# Patient Record
Sex: FEMALE | Race: WHITE | NOT HISPANIC OR LATINO | Employment: OTHER | URBAN - METROPOLITAN AREA
[De-identification: names, ages, dates, MRNs, and addresses within clinical notes are randomized per-mention and may not be internally consistent; named-entity substitution may affect disease eponyms.]

---

## 2023-02-08 PROBLEM — R25.1 TREMOR: Status: ACTIVE | Noted: 2023-02-08

## 2023-02-08 PROBLEM — H01.003 BLEPHARITIS OF BOTH EYES: Status: ACTIVE | Noted: 2023-02-08

## 2023-02-08 PROBLEM — R53.83 FATIGUE: Status: ACTIVE | Noted: 2023-02-08

## 2023-02-08 PROBLEM — I50.22 CHRONIC SYSTOLIC HEART FAILURE, ACC/AHA STAGE C (MULTI): Status: ACTIVE | Noted: 2023-02-08

## 2023-02-08 PROBLEM — R22.31 LOCALIZED SWELLING ON RIGHT HAND: Status: ACTIVE | Noted: 2023-02-08

## 2023-02-08 PROBLEM — R55 POSTURAL DIZZINESS WITH PRESYNCOPE: Status: ACTIVE | Noted: 2023-02-08

## 2023-02-08 PROBLEM — R42 POSTURAL DIZZINESS WITH PRESYNCOPE: Status: ACTIVE | Noted: 2023-02-08

## 2023-02-08 PROBLEM — M79.601 PAIN OF RIGHT UPPER EXTREMITY: Status: ACTIVE | Noted: 2023-02-08

## 2023-02-08 PROBLEM — S79.912S: Status: ACTIVE | Noted: 2023-02-08

## 2023-02-08 PROBLEM — I72.9 PSEUDOANEURYSM (CMS-HCC): Status: ACTIVE | Noted: 2023-02-08

## 2023-02-08 PROBLEM — H04.123 DRY EYES: Status: ACTIVE | Noted: 2023-02-08

## 2023-02-08 PROBLEM — E11.51: Status: ACTIVE | Noted: 2023-02-08

## 2023-02-08 PROBLEM — Z96.1 PSEUDOPHAKIA OF BOTH EYES: Status: ACTIVE | Noted: 2023-02-08

## 2023-02-08 PROBLEM — E55.9 VITAMIN D DEFICIENCY: Status: ACTIVE | Noted: 2023-02-08

## 2023-02-08 PROBLEM — S59.902S ELBOW INJURY, LEFT, SEQUELA: Status: ACTIVE | Noted: 2023-02-08

## 2023-02-08 PROBLEM — H01.006 BLEPHARITIS OF BOTH EYES: Status: ACTIVE | Noted: 2023-02-08

## 2023-02-08 PROBLEM — K55.9 ISCHEMIC COLITIS (MULTI): Status: ACTIVE | Noted: 2023-02-08

## 2023-02-08 PROBLEM — E78.5 HYPERLIPIDEMIA: Status: ACTIVE | Noted: 2023-02-08

## 2023-02-08 PROBLEM — I10 HYPERTENSION: Status: ACTIVE | Noted: 2023-02-08

## 2023-02-08 PROBLEM — R42 DIZZINESS AND GIDDINESS: Status: ACTIVE | Noted: 2023-02-08

## 2023-02-08 PROBLEM — I48.0 PAROXYSMAL ATRIAL FIBRILLATION WITH RAPID VENTRICULAR RESPONSE (MULTI): Status: ACTIVE | Noted: 2023-02-08

## 2023-02-08 PROBLEM — Z91.81 AT RISK FOR FALLS: Status: ACTIVE | Noted: 2023-02-08

## 2023-02-08 PROBLEM — R20.8 BURNING SENSATION OF RECTUM: Status: ACTIVE | Noted: 2023-02-08

## 2023-02-08 PROBLEM — E03.9 HYPOTHYROIDISM: Status: ACTIVE | Noted: 2023-02-08

## 2023-02-08 PROBLEM — R00.1 BRADYCARDIA: Status: ACTIVE | Noted: 2023-02-08

## 2023-02-08 PROBLEM — K52.9 COLITIS: Status: ACTIVE | Noted: 2023-02-08

## 2023-02-08 PROBLEM — N28.1 BILATERAL RENAL CYSTS: Status: ACTIVE | Noted: 2023-02-08

## 2023-02-08 PROBLEM — H52.209 ASTIGMATISM: Status: ACTIVE | Noted: 2023-02-08

## 2023-02-08 PROBLEM — S59.912S: Status: ACTIVE | Noted: 2023-02-08

## 2023-02-08 RX ORDER — THIAMINE HCL 50 MG
50 TABLET ORAL DAILY
COMMUNITY
End: 2024-04-24 | Stop reason: ALTCHOICE

## 2023-02-08 RX ORDER — CLOPIDOGREL BISULFATE 75 MG/1
TABLET ORAL
COMMUNITY
End: 2023-11-21 | Stop reason: ALTCHOICE

## 2023-02-08 RX ORDER — LEVOTHYROXINE SODIUM 50 UG/1
TABLET ORAL
COMMUNITY
Start: 2020-09-15 | End: 2023-03-17

## 2023-02-08 RX ORDER — AMIODARONE HYDROCHLORIDE 200 MG/1
TABLET ORAL
COMMUNITY
Start: 2020-08-14 | End: 2023-11-21 | Stop reason: ALTCHOICE

## 2023-02-08 RX ORDER — ATORVASTATIN CALCIUM 20 MG/1
20 TABLET, FILM COATED ORAL DAILY
COMMUNITY
Start: 2020-08-14 | End: 2024-03-13 | Stop reason: SDUPTHER

## 2023-02-08 RX ORDER — LOSARTAN POTASSIUM 25 MG/1
25 TABLET ORAL DAILY
COMMUNITY
Start: 2021-06-01 | End: 2024-03-13 | Stop reason: SDUPTHER

## 2023-02-08 RX ORDER — FUROSEMIDE 20 MG/1
20 TABLET ORAL 3 TIMES WEEKLY
COMMUNITY
Start: 2021-04-06 | End: 2023-12-14 | Stop reason: SDUPTHER

## 2023-02-08 RX ORDER — FOLIC ACID 1 MG/1
TABLET ORAL
COMMUNITY

## 2023-02-08 RX ORDER — ACETAMINOPHEN 500 MG
TABLET ORAL
COMMUNITY

## 2023-03-15 LAB
ALANINE AMINOTRANSFERASE (SGPT) (U/L) IN SER/PLAS: 39 U/L (ref 7–45)
ANION GAP IN SER/PLAS: 13 MMOL/L (ref 10–20)
ASPARTATE AMINOTRANSFERASE (SGOT) (U/L) IN SER/PLAS: 44 U/L (ref 9–39)
CALCIUM (MG/DL) IN SER/PLAS: 9.5 MG/DL (ref 8.6–10.3)
CARBON DIOXIDE, TOTAL (MMOL/L) IN SER/PLAS: 24 MMOL/L (ref 21–32)
CHLORIDE (MMOL/L) IN SER/PLAS: 104 MMOL/L (ref 98–107)
CREATININE (MG/DL) IN SER/PLAS: 1.15 MG/DL (ref 0.5–1.05)
ERYTHROCYTE DISTRIBUTION WIDTH (RATIO) BY AUTOMATED COUNT: 14.6 % (ref 11.5–14.5)
ERYTHROCYTE MEAN CORPUSCULAR HEMOGLOBIN CONCENTRATION (G/DL) BY AUTOMATED: 30.8 G/DL (ref 32–36)
ERYTHROCYTE MEAN CORPUSCULAR VOLUME (FL) BY AUTOMATED COUNT: 104 FL (ref 80–100)
ERYTHROCYTES (10*6/UL) IN BLOOD BY AUTOMATED COUNT: 4.63 X10E12/L (ref 4–5.2)
GFR FEMALE: 45 ML/MIN/1.73M2
GLUCOSE (MG/DL) IN SER/PLAS: 83 MG/DL (ref 74–99)
HEMATOCRIT (%) IN BLOOD BY AUTOMATED COUNT: 48.1 % (ref 36–46)
HEMOGLOBIN (G/DL) IN BLOOD: 14.8 G/DL (ref 12–16)
LEUKOCYTES (10*3/UL) IN BLOOD BY AUTOMATED COUNT: 5.1 X10E9/L (ref 4.4–11.3)
PLATELETS (10*3/UL) IN BLOOD AUTOMATED COUNT: 209 X10E9/L (ref 150–450)
POTASSIUM (MMOL/L) IN SER/PLAS: 3.8 MMOL/L (ref 3.5–5.3)
SODIUM (MMOL/L) IN SER/PLAS: 137 MMOL/L (ref 136–145)
THYROTROPIN (MIU/L) IN SER/PLAS BY DETECTION LIMIT <= 0.05 MIU/L: 2.37 MIU/L (ref 0.44–3.98)
UREA NITROGEN (MG/DL) IN SER/PLAS: 19 MG/DL (ref 6–23)

## 2023-03-16 DIAGNOSIS — E03.9 HYPOTHYROIDISM, UNSPECIFIED: ICD-10-CM

## 2023-03-17 RX ORDER — LEVOTHYROXINE SODIUM 50 UG/1
50 TABLET ORAL DAILY
Qty: 90 TABLET | Refills: 2 | Status: SHIPPED | OUTPATIENT
Start: 2023-03-17 | End: 2023-12-06 | Stop reason: ALTCHOICE

## 2023-03-21 ENCOUNTER — OFFICE VISIT (OUTPATIENT)
Dept: PRIMARY CARE | Facility: CLINIC | Age: 88
End: 2023-03-21
Payer: MEDICARE

## 2023-03-21 VITALS
DIASTOLIC BLOOD PRESSURE: 72 MMHG | HEIGHT: 62 IN | HEART RATE: 56 BPM | RESPIRATION RATE: 16 BRPM | BODY MASS INDEX: 19.88 KG/M2 | SYSTOLIC BLOOD PRESSURE: 116 MMHG | WEIGHT: 108 LBS | OXYGEN SATURATION: 97 %

## 2023-03-21 DIAGNOSIS — I10 PRIMARY HYPERTENSION: ICD-10-CM

## 2023-03-21 DIAGNOSIS — Z09 FOLLOW UP: ICD-10-CM

## 2023-03-21 DIAGNOSIS — N18.9 CHRONIC KIDNEY DISEASE, UNSPECIFIED CKD STAGE: ICD-10-CM

## 2023-03-21 DIAGNOSIS — I50.22 CHRONIC SYSTOLIC HEART FAILURE, ACC/AHA STAGE C (MULTI): ICD-10-CM

## 2023-03-21 DIAGNOSIS — R42 DIZZINESS AND GIDDINESS: ICD-10-CM

## 2023-03-21 DIAGNOSIS — E55.9 VITAMIN D DEFICIENCY: ICD-10-CM

## 2023-03-21 DIAGNOSIS — Z71.89 ADVANCE DIRECTIVE DISCUSSED WITH PATIENT: ICD-10-CM

## 2023-03-21 DIAGNOSIS — Z00.00 MEDICARE ANNUAL WELLNESS VISIT, SUBSEQUENT: Primary | ICD-10-CM

## 2023-03-21 DIAGNOSIS — E03.9 HYPOTHYROIDISM, UNSPECIFIED TYPE: ICD-10-CM

## 2023-03-21 DIAGNOSIS — Z13.31 SCREENING FOR DEPRESSION: ICD-10-CM

## 2023-03-21 DIAGNOSIS — I48.0 PAROXYSMAL ATRIAL FIBRILLATION WITH RAPID VENTRICULAR RESPONSE (MULTI): ICD-10-CM

## 2023-03-21 DIAGNOSIS — E78.5 HYPERLIPIDEMIA, UNSPECIFIED HYPERLIPIDEMIA TYPE: ICD-10-CM

## 2023-03-21 PROCEDURE — 1157F ADVNC CARE PLAN IN RCRD: CPT | Performed by: NURSE PRACTITIONER

## 2023-03-21 PROCEDURE — 99214 OFFICE O/P EST MOD 30 MIN: CPT | Performed by: NURSE PRACTITIONER

## 2023-03-21 PROCEDURE — 1036F TOBACCO NON-USER: CPT | Performed by: NURSE PRACTITIONER

## 2023-03-21 PROCEDURE — 1170F FXNL STATUS ASSESSED: CPT | Performed by: NURSE PRACTITIONER

## 2023-03-21 PROCEDURE — 3078F DIAST BP <80 MM HG: CPT | Performed by: NURSE PRACTITIONER

## 2023-03-21 PROCEDURE — 1160F RVW MEDS BY RX/DR IN RCRD: CPT | Performed by: NURSE PRACTITIONER

## 2023-03-21 PROCEDURE — 1159F MED LIST DOCD IN RCRD: CPT | Performed by: NURSE PRACTITIONER

## 2023-03-21 PROCEDURE — G0439 PPPS, SUBSEQ VISIT: HCPCS | Performed by: NURSE PRACTITIONER

## 2023-03-21 PROCEDURE — 3074F SYST BP LT 130 MM HG: CPT | Performed by: NURSE PRACTITIONER

## 2023-03-21 ASSESSMENT — ACTIVITIES OF DAILY LIVING (ADL)
DRESSING: INDEPENDENT
MANAGING_FINANCES: INDEPENDENT
TAKING_MEDICATION: INDEPENDENT
BATHING: INDEPENDENT
DOING_HOUSEWORK: INDEPENDENT
GROCERY_SHOPPING: INDEPENDENT

## 2023-03-21 ASSESSMENT — ENCOUNTER SYMPTOMS
LOSS OF SENSATION IN FEET: 0
OCCASIONAL FEELINGS OF UNSTEADINESS: 0
DEPRESSION: 0

## 2023-03-21 ASSESSMENT — PATIENT HEALTH QUESTIONNAIRE - PHQ9
SUM OF ALL RESPONSES TO PHQ9 QUESTIONS 1 AND 2: 0
2. FEELING DOWN, DEPRESSED OR HOPELESS: NOT AT ALL
1. LITTLE INTEREST OR PLEASURE IN DOING THINGS: NOT AT ALL

## 2023-03-21 NOTE — PROGRESS NOTES
"Subjective   Reason for Visit: Mirtha Acuna is an 89 y.o. female here for a Medicare Wellness visit.     Past Medical, Surgical, and Family History reviewed and updated in chart.    Reviewed all medications by prescribing practitioner or clinical pharmacist (such as prescriptions, OTCs, herbal therapies and supplements) and documented in the medical record.    Patient is also here for management of multiple chronic diseases.  Most recent lab result completed by cardiology indicate worsening kidney function.  However, patient continued to refuse consult with nephrology.  She denies acute medical complaint.        Patient Care Team:  ANJALI Jauregui-CNP as PCP - General     Review of Systems   All other systems reviewed and are negative.      Objective   Vitals:  /72 (BP Location: Left arm)   Pulse 56   Resp 16   Ht 1.575 m (5' 2\")   Wt 49 kg (108 lb)   SpO2 97%   BMI 19.75 kg/m²       Physical Exam  HENT:      Head: Atraumatic.      Mouth/Throat:      Mouth: Mucous membranes are moist.   Eyes:      Extraocular Movements: Extraocular movements intact.      Pupils: Pupils are equal, round, and reactive to light.   Cardiovascular:      Rate and Rhythm: Bradycardia present. Rhythm irregular.   Pulmonary:      Effort: Pulmonary effort is normal.      Breath sounds: Normal breath sounds.   Abdominal:      General: Abdomen is flat. Bowel sounds are normal.      Palpations: Abdomen is soft.   Musculoskeletal:         General: Normal range of motion.      Cervical back: Neck supple.   Skin:     General: Skin is warm and dry.      Capillary Refill: Capillary refill takes less than 2 seconds.   Neurological:      Mental Status: She is alert. Mental status is at baseline.   Psychiatric:         Mood and Affect: Mood normal.         Behavior: Behavior normal.         Thought Content: Thought content normal.         Judgment: Judgment normal.         Assessment/Plan   Problem List Items Addressed This Visit  "         Circulatory    Chronic systolic heart failure, ACC/AHA stage C (CMS/HCC)    Paroxysmal atrial fibrillation with rapid ventricular response (CMS/HCC)    Hypertension       Endocrine/Metabolic    Hypothyroidism    Vitamin D deficiency       Other    Dizziness and giddiness    Hyperlipidemia     Other Visit Diagnoses       Follow up    -  Primary    Relevant Orders    Follow Up In [facility] - PCP    Medicare annual wellness visit, subsequent        Advance directive discussed with patient        Screening for depression        Chronic kidney disease, unspecified CKD stage        Relevant Orders    Basic metabolic panel

## 2023-03-21 NOTE — PATIENT INSTRUCTIONS
Continue with all current medications as directed and follow up in 6 months. Check kidney function prior to follow up in 6 months.

## 2023-09-21 RX ORDER — UBIDECARENONE 75 MG
CAPSULE ORAL ONCE
Status: CANCELLED | OUTPATIENT
Start: 2023-09-21 | End: 2023-09-21

## 2023-09-23 ENCOUNTER — DOCUMENTATION (OUTPATIENT)
Dept: CARDIOLOGY | Facility: CLINIC | Age: 88
End: 2023-09-23
Payer: MEDICARE

## 2023-10-03 ENCOUNTER — APPOINTMENT (OUTPATIENT)
Dept: CARDIOLOGY | Facility: CLINIC | Age: 88
End: 2023-10-03
Payer: MEDICARE

## 2023-10-24 ENCOUNTER — OFFICE VISIT (OUTPATIENT)
Dept: PRIMARY CARE | Facility: CLINIC | Age: 88
End: 2023-10-24
Payer: MEDICARE

## 2023-10-24 VITALS
WEIGHT: 107.4 LBS | BODY MASS INDEX: 19.77 KG/M2 | HEART RATE: 96 BPM | SYSTOLIC BLOOD PRESSURE: 114 MMHG | OXYGEN SATURATION: 96 % | DIASTOLIC BLOOD PRESSURE: 68 MMHG | RESPIRATION RATE: 16 BRPM | HEIGHT: 62 IN

## 2023-10-24 DIAGNOSIS — E55.9 VITAMIN D DEFICIENCY: ICD-10-CM

## 2023-10-24 DIAGNOSIS — I10 PRIMARY HYPERTENSION: Primary | ICD-10-CM

## 2023-10-24 DIAGNOSIS — E03.9 HYPOTHYROIDISM, UNSPECIFIED TYPE: ICD-10-CM

## 2023-10-24 DIAGNOSIS — I48.0 PAROXYSMAL ATRIAL FIBRILLATION WITH RAPID VENTRICULAR RESPONSE (MULTI): ICD-10-CM

## 2023-10-24 DIAGNOSIS — Z00.00 MEDICARE ANNUAL WELLNESS VISIT, SUBSEQUENT: ICD-10-CM

## 2023-10-24 DIAGNOSIS — I50.22 CHRONIC SYSTOLIC HEART FAILURE, ACC/AHA STAGE C (MULTI): ICD-10-CM

## 2023-10-24 PROBLEM — M79.603 ARM PAIN: Status: ACTIVE | Noted: 2023-10-24

## 2023-10-24 PROBLEM — Z95.5 PRESENCE OF STENT IN CORONARY ARTERY: Status: ACTIVE | Noted: 2023-10-24

## 2023-10-24 PROCEDURE — 1160F RVW MEDS BY RX/DR IN RCRD: CPT | Performed by: NURSE PRACTITIONER

## 2023-10-24 PROCEDURE — 3078F DIAST BP <80 MM HG: CPT | Performed by: NURSE PRACTITIONER

## 2023-10-24 PROCEDURE — 3074F SYST BP LT 130 MM HG: CPT | Performed by: NURSE PRACTITIONER

## 2023-10-24 PROCEDURE — 1126F AMNT PAIN NOTED NONE PRSNT: CPT | Performed by: NURSE PRACTITIONER

## 2023-10-24 PROCEDURE — 1159F MED LIST DOCD IN RCRD: CPT | Performed by: NURSE PRACTITIONER

## 2023-10-24 PROCEDURE — 1036F TOBACCO NON-USER: CPT | Performed by: NURSE PRACTITIONER

## 2023-10-24 PROCEDURE — 99214 OFFICE O/P EST MOD 30 MIN: CPT | Performed by: NURSE PRACTITIONER

## 2023-10-24 RX ORDER — VITS A,C,E/LUTEIN/MINERALS 300MCG-200
1 TABLET ORAL DAILY
COMMUNITY
End: 2024-04-24 | Stop reason: ALTCHOICE

## 2023-10-24 RX ORDER — ACETAMINOPHEN 500 MG
TABLET ORAL
COMMUNITY
End: 2024-04-24 | Stop reason: ALTCHOICE

## 2023-10-24 ASSESSMENT — ANXIETY QUESTIONNAIRES
5. BEING SO RESTLESS THAT IT IS HARD TO SIT STILL: NOT AT ALL
6. BECOMING EASILY ANNOYED OR IRRITABLE: NOT AT ALL
3. WORRYING TOO MUCH ABOUT DIFFERENT THINGS: NOT AT ALL
IF YOU CHECKED OFF ANY PROBLEMS ON THIS QUESTIONNAIRE, HOW DIFFICULT HAVE THESE PROBLEMS MADE IT FOR YOU TO DO YOUR WORK, TAKE CARE OF THINGS AT HOME, OR GET ALONG WITH OTHER PEOPLE: NOT DIFFICULT AT ALL
4. TROUBLE RELAXING: NOT AT ALL
7. FEELING AFRAID AS IF SOMETHING AWFUL MIGHT HAPPEN: NOT AT ALL
GAD7 TOTAL SCORE: 0
2. NOT BEING ABLE TO STOP OR CONTROL WORRYING: NOT AT ALL
1. FEELING NERVOUS, ANXIOUS, OR ON EDGE: NOT AT ALL

## 2023-10-24 ASSESSMENT — COLUMBIA-SUICIDE SEVERITY RATING SCALE - C-SSRS
6. HAVE YOU EVER DONE ANYTHING, STARTED TO DO ANYTHING, OR PREPARED TO DO ANYTHING TO END YOUR LIFE?: NO
2. HAVE YOU ACTUALLY HAD ANY THOUGHTS OF KILLING YOURSELF?: NO
1. IN THE PAST MONTH, HAVE YOU WISHED YOU WERE DEAD OR WISHED YOU COULD GO TO SLEEP AND NOT WAKE UP?: NO

## 2023-10-24 ASSESSMENT — ENCOUNTER SYMPTOMS
LOSS OF SENSATION IN FEET: 0
OCCASIONAL FEELINGS OF UNSTEADINESS: 0
DEPRESSION: 0

## 2023-10-24 ASSESSMENT — PATIENT HEALTH QUESTIONNAIRE - PHQ9
SUM OF ALL RESPONSES TO PHQ9 QUESTIONS 1 AND 2: 0
1. LITTLE INTEREST OR PLEASURE IN DOING THINGS: NOT AT ALL
2. FEELING DOWN, DEPRESSED OR HOPELESS: NOT AT ALL

## 2023-10-24 NOTE — PATIENT INSTRUCTIONS
Continue taking all current medications as prescribed and complete labs a week prior to 6 months follow up for annual medicare wellness exam.

## 2023-10-24 NOTE — PROGRESS NOTES
"Subjective   Patient ID: Mirtha Acuna is a 89 y.o. female who presents for Follow-up.    Patient is ambulatory with a cane for assistance, accompanied by her daughter-in-law following up for management of multiple chronic diseases.  Advises that she is compliant with her medications.  Advises no new symptoms besides chronic persistent exertional dyspnea, tremor and dizziness.         Review of Systems   All other systems reviewed and are negative.      Objective   /68   Pulse 96   Resp 16   Ht 1.575 m (5' 2\")   Wt 48.7 kg (107 lb 6.4 oz)   SpO2 96%   BMI 19.64 kg/m²     Physical Exam  Constitutional:       Appearance: Normal appearance.   HENT:      Head: Normocephalic and atraumatic.      Right Ear: External ear normal.      Left Ear: External ear normal.      Nose: Nose normal.      Mouth/Throat:      Mouth: Mucous membranes are moist.   Cardiovascular:      Rate and Rhythm: Normal rate and regular rhythm.      Pulses: Normal pulses.      Heart sounds: Normal heart sounds.   Pulmonary:      Effort: Pulmonary effort is normal.      Breath sounds: Normal breath sounds.   Abdominal:      General: Abdomen is flat. Bowel sounds are normal.      Palpations: Abdomen is soft.   Musculoskeletal:      Cervical back: Neck supple.   Skin:     General: Skin is warm and dry.   Neurological:      Mental Status: She is alert and oriented to person, place, and time. Mental status is at baseline.   Psychiatric:         Mood and Affect: Mood normal.         Behavior: Behavior normal.         Thought Content: Thought content normal.         Judgment: Judgment normal.         Assessment/Plan   Problem List Items Addressed This Visit       Chronic systolic heart failure, ACC/AHA stage C (CMS/HCC)    Relevant Orders    Basic metabolic panel    Hypothyroidism    Relevant Orders    TSH with reflex to Free T4 if abnormal    Hypertension - Primary    Relevant Orders    CBC    Vitamin D deficiency    Relevant Orders    Vitamin " B12    Vitamin D 25-Hydroxy,Total (for eval of Vitamin D levels)     Other Visit Diagnoses       Medicare annual wellness visit, subsequent        Relevant Orders    Follow Up In [facility] - PCP - Medicare Annual

## 2023-11-07 ENCOUNTER — APPOINTMENT (OUTPATIENT)
Dept: CARDIOLOGY | Facility: CLINIC | Age: 88
End: 2023-11-07
Payer: MEDICARE

## 2023-11-10 ENCOUNTER — LAB (OUTPATIENT)
Dept: LAB | Facility: LAB | Age: 88
End: 2023-11-10
Payer: MEDICARE

## 2023-11-10 DIAGNOSIS — E55.9 VITAMIN D DEFICIENCY: ICD-10-CM

## 2023-11-10 DIAGNOSIS — I50.22 CHRONIC SYSTOLIC HEART FAILURE, ACC/AHA STAGE C (MULTI): ICD-10-CM

## 2023-11-10 DIAGNOSIS — I10 PRIMARY HYPERTENSION: ICD-10-CM

## 2023-11-10 DIAGNOSIS — E03.9 HYPOTHYROIDISM, UNSPECIFIED TYPE: ICD-10-CM

## 2023-11-10 DIAGNOSIS — N18.9 CHRONIC KIDNEY DISEASE, UNSPECIFIED CKD STAGE: ICD-10-CM

## 2023-11-10 LAB
25(OH)D3 SERPL-MCNC: >120 NG/ML (ref 30–100)
ANION GAP SERPL CALC-SCNC: 15 MMOL/L (ref 10–20)
BUN SERPL-MCNC: 21 MG/DL (ref 6–23)
CALCIUM SERPL-MCNC: 9 MG/DL (ref 8.6–10.3)
CHLORIDE SERPL-SCNC: 108 MMOL/L (ref 98–107)
CO2 SERPL-SCNC: 21 MMOL/L (ref 21–32)
CREAT SERPL-MCNC: 1 MG/DL (ref 0.5–1.05)
ERYTHROCYTE [DISTWIDTH] IN BLOOD BY AUTOMATED COUNT: 14.2 % (ref 11.5–14.5)
GFR SERPL CREATININE-BSD FRML MDRD: 53 ML/MIN/1.73M*2
GLUCOSE SERPL-MCNC: 104 MG/DL (ref 74–99)
HCT VFR BLD AUTO: 42.3 % (ref 36–46)
HGB BLD-MCNC: 14.4 G/DL (ref 12–16)
MCH RBC QN AUTO: 31.9 PG (ref 26–34)
MCHC RBC AUTO-ENTMCNC: 34 G/DL (ref 32–36)
MCV RBC AUTO: 94 FL (ref 80–100)
NRBC BLD-RTO: 0 /100 WBCS (ref 0–0)
PLATELET # BLD AUTO: 247 X10*3/UL (ref 150–450)
POTASSIUM SERPL-SCNC: 4.7 MMOL/L (ref 3.5–5.3)
RBC # BLD AUTO: 4.51 X10*6/UL (ref 4–5.2)
SODIUM SERPL-SCNC: 139 MMOL/L (ref 136–145)
T4 FREE SERPL-MCNC: 1.74 NG/DL (ref 0.61–1.12)
TSH SERPL-ACNC: 0.03 MIU/L (ref 0.44–3.98)
VIT B12 SERPL-MCNC: 3253 PG/ML (ref 211–911)
WBC # BLD AUTO: 5.1 X10*3/UL (ref 4.4–11.3)

## 2023-11-10 PROCEDURE — 82607 VITAMIN B-12: CPT

## 2023-11-10 PROCEDURE — 85027 COMPLETE CBC AUTOMATED: CPT

## 2023-11-10 PROCEDURE — 36415 COLL VENOUS BLD VENIPUNCTURE: CPT

## 2023-11-10 PROCEDURE — 82306 VITAMIN D 25 HYDROXY: CPT

## 2023-11-10 PROCEDURE — 84443 ASSAY THYROID STIM HORMONE: CPT

## 2023-11-10 PROCEDURE — 84439 ASSAY OF FREE THYROXINE: CPT

## 2023-11-10 PROCEDURE — 80048 BASIC METABOLIC PNL TOTAL CA: CPT

## 2023-11-14 NOTE — PROGRESS NOTES
Chief Complaint/Reason for Visit:   8 month cardiovascular follow up.     History Of Present Illness:      Ms. Acuna is coming in today as an 8-month cardiovascular follow-up.  We have followed this patient previously for atrial fibrillation, systolic heart failure, moderate, nonobstructive coronary artery disease, and bradycardia.  She had left and right heart catheterizations in 2020 which showed normal hemodynamics and moderate nonobstructive coronary artery disease.  She has followed consistently with the CHF clinic.  Most recent echocardiogram was done in December, 2021 showing normalization of her LVEF (60-65%).    Patient presents today in a wheelchair accompanied by her granddaughter.  She is very anxious and all upset about coming to the office visit.  She complains of being cold and shaky and feels short of breath although her pulse oximeter shows O2 sat of 96%.  Her granddaughter believes it is anxiety.  Patient is not having any chest pain, pressure, palpitations, or edema.  She does note dyspnea  primarily when she is anxious.  According to the granddaughter, she has been having issues with worsening memory.  Patient continues to live independently.  She is taking her medication but does not have an updated medication list during this office visit.    Past Medical History:  She has a past medical history of Personal history of other diseases of the circulatory system, Pure hypercholesterolemia, unspecified, Unspecified cataract, and Unspecified cataract.    Past Surgical History:  She has a past surgical history that includes Tonsillectomy (05/05/2015); Hysterectomy (05/05/2015); Appendectomy (05/05/2015); Back surgery (05/05/2015); CT angio abdomen pelvis w and or wo IV IV contrast (7/24/2020); and CT angio abdomen pelvis w and or wo IV IV contrast (8/3/2020).      Social History:  She reports that she has never smoked. She has never used smokeless tobacco. No history on file for alcohol use and drug  use.    Family History:  Family History   Problem Relation Name Age of Onset    Hypertension Mother      Alzheimer's disease Sister      Hypertension Sister      Breast cancer Sister      Hypertension Brother          Allergies:  Penicillins, Sulfamethoxazole, and Erythromycin    Medications:  Current Outpatient Medications   Medication Instructions    acetaminophen (Tylenol) 500 mg tablet No dose, route, or frequency recorded.    apixaban (Eliquis) 2.5 mg tablet Take one tablet every 12 hours    atorvastatin (Lipitor) 20 mg tablet Daily    cholecalciferol (Vitamin D-3) 50 mcg (2,000 unit) capsule oral    cyanocobalamin (Vitamin B-12) 50 mcg tablet TAKE 1 TABLET DAILY.    folic acid (Folvite) 1 mg tablet TAKE 1 TABLET DAILY.    furosemide (Lasix) 20 mg tablet Take 1 tablet (20 mg) by mouth 3 times a week. Take on Monday-Wednesday and Saturday    levothyroxine (SYNTHROID, LEVOXYL) 50 mcg, oral, Daily    losartan (Cozaar) 25 mg tablet 1 tablet daily.    NON FORMULARY Prevagen 10 MG Oral Capsule; 1 tablet daily.    thiamine (Vitamin B-1) 50 mg tablet No dose, route, or frequency recorded.    vit A,C and E-lutein-minerals (Ocuvite with Lutein) 300 mcg-200 mg-27 mg-2 mg tablet 1 tablet, oral, Daily       Review of Systems:  Review of Systems   Constitutional: Positive for malaise/fatigue. Negative for decreased appetite.   HENT: Negative.     Eyes:  Negative for blurred vision and visual disturbance.   Cardiovascular:  Positive for dyspnea on exertion. Negative for chest pain, irregular heartbeat, leg swelling, orthopnea, palpitations and syncope.   Respiratory: Negative.  Negative for cough and shortness of breath.    Musculoskeletal:  Negative for arthritis and falls.   Gastrointestinal: Negative.    Neurological:  Negative for focal weakness and light-headedness.   Psychiatric/Behavioral:  Positive for memory loss. Negative for depression. The patient is nervous/anxious.         Vitals  Visit Vitals  /72   Pulse  52   Wt 47.6 kg (105 lb)   BMI 19.20 kg/m²   OB Status Postmenopausal   Smoking Status Never   BSA 1.44 m²          Physical Exam:  Constitutional: alert and in no acute distress.   No JVD  Pulmonary: no increased work of breathing or signs, mild respiratory difficulty  and lungs clear to auscultation.    Cardiovascular: carotid pulses 2+ bilaterally with no bruit , JVP was normal, regular rhythm, normal S1 and S2, no murmurs , pedal pulses 2+ bilaterally  and no edema .   Abdomen: Soft, non tender, normal bowel sounds.   Tremors of hands  In WC today, MCCALL   Psychiatric:  Pleasantly confused, anxious today         Last Labs:  CBC -  Lab Results   Component Value Date    WBC 5.1 11/10/2023    HGB 14.4 11/10/2023    HCT 42.3 11/10/2023    MCV 94 11/10/2023     11/10/2023     Lab Results   Component Value Date    GLUCOSE 104 (H) 11/10/2023    CALCIUM 9.0 11/10/2023     11/10/2023    K 4.7 11/10/2023    CO2 21 11/10/2023     (H) 11/10/2023    BUN 21 11/10/2023    CREATININE 1.00 11/10/2023      CMP -  Lab Results   Component Value Date    CALCIUM 9.0 11/10/2023    PHOS 3.3 2021    PROT 6.8 2022    ALBUMIN 3.8 2022    AST 44 (H) 03/15/2023    ALT 39 03/15/2023    ALKPHOS 78 2022    BILITOT 0.5 2022       LIPID PANEL -   Lab Results   Component Value Date    CHOL 122 2021    TRIG 46 2021    HDL 45.6 2021    CHHDL 2.7 2021    LDLF 67 2021    VLDL 9 2021       Lab Results   Component Value Date    BNP 96 2022    HGBA1C 5.6 2021       Last Cardiology Tests:    EK23  EKG done in the office today and personally reviewed shows a junctional rhythm at 47 bpm, QT intervals 502 ms.  This will go to Dr. Zaragoza for review.      Echo:21  CONCLUSIONS:   1. The left ventricular systolic function is normal with a 60-65% estimated ejection fraction.   2. Spectral Doppler shows an impaired relaxation pattern of left  ventricular diastolic filling.     Cath:12-27-21  CONCLUSIONS:   1. Single vessel coronary artery disease without proximal left anterior descending involvement.   2. The 1st diagonal branch showed moderate atherosclerotic disease.   3. Culprit vessel(s): circumflex.   4. Successful PCI of Cx.       Lab review: I have personally reviewed the laboratory result(s)     Assessment/Plan:  Paroxysmal atrial fibrillation: Patient has been maintaining sinus rhythm with use of amiodarone.  Based on her chart, she is on 200 mg daily.  It appears that at last office visit she was to reduce this to 100 mg daily.  The patient's family will need to confirm her medication and let us know what she is taking.  I gave the patient an order for amiodarone labs that would be due in 6 months.  Patient is anticoagulated with Eliquis.  She is not having any abnormal bleeding or bruising and will continue on anticoagulation.    Chronic systolic heart failure: Patient has a history of nonischemic cardiomyopathy that has normalized over time.  Her most recent ejection fraction was 60-65% (2021) patient appears to be well compensated.  Due to side effects of medication and blood pressure issues, she is only on losartan.  It is unclear if she is continuing on furosemide.    Bradycardia: Patient has a history of bradycardia and today appears to be junctional rhythm which is not new for her.  We may want to discontinue amiodarone altogether.  I will have Dr. Zaragoza review this case and I will touch base with the patient's family.  Patient previously was encouraged to make an appointment with Dr. Matt but declined.    Patient is currently scheduled to follow-up with Dr. Zaragoza in March.  I will get back with the patient's family once I review this case further with Dr. Zaragoza.  Patient instructed to call with any cardiovascular complaints. All questions were answered.       ADDENDUM:   I called and spoke with patient's  daughter-in-law, Christiane Dias and her son Nabor.  Patient's medications were reviewed and updated.  She is currently off of amiodarone, and clopidogrel.  She takes furosemide 3 days/week.  I reviewed the patient's abnormal EKG with heart rate in the 40s and discussed possible referral to the EP service.  At this point, the family declines referral to EP due to her advanced age and memory issues.  They prefer more conservative medical therapy.  Patient will continue on her current regimen and follow-up as planned.    Dragon dictation was utilized to create this document. Quite often unanticipated grammatical, syntax,  and other interpretive errors are inadvertently transcribed by the computer software.  Please disregard these errors.  Please excuse any errors that have escaped final proofreading.                Rola Sousa, APRN-CNP

## 2023-11-21 ENCOUNTER — OFFICE VISIT (OUTPATIENT)
Dept: CARDIOLOGY | Facility: CLINIC | Age: 88
End: 2023-11-21
Payer: MEDICARE

## 2023-11-21 VITALS
HEART RATE: 52 BPM | BODY MASS INDEX: 19.2 KG/M2 | WEIGHT: 105 LBS | SYSTOLIC BLOOD PRESSURE: 120 MMHG | DIASTOLIC BLOOD PRESSURE: 72 MMHG

## 2023-11-21 DIAGNOSIS — I10 PRIMARY HYPERTENSION: ICD-10-CM

## 2023-11-21 DIAGNOSIS — I50.22 CHRONIC SYSTOLIC HEART FAILURE, ACC/AHA STAGE C (MULTI): ICD-10-CM

## 2023-11-21 DIAGNOSIS — R00.1 BRADYCARDIA: ICD-10-CM

## 2023-11-21 DIAGNOSIS — E78.5 HYPERLIPIDEMIA, UNSPECIFIED HYPERLIPIDEMIA TYPE: ICD-10-CM

## 2023-11-21 DIAGNOSIS — I25.10 ASHD (ARTERIOSCLEROTIC HEART DISEASE): ICD-10-CM

## 2023-11-21 DIAGNOSIS — I48.0 PAROXYSMAL ATRIAL FIBRILLATION WITH RAPID VENTRICULAR RESPONSE (MULTI): Primary | ICD-10-CM

## 2023-11-21 PROCEDURE — 1160F RVW MEDS BY RX/DR IN RCRD: CPT | Performed by: NURSE PRACTITIONER

## 2023-11-21 PROCEDURE — 1126F AMNT PAIN NOTED NONE PRSNT: CPT | Performed by: NURSE PRACTITIONER

## 2023-11-21 PROCEDURE — 3074F SYST BP LT 130 MM HG: CPT | Performed by: NURSE PRACTITIONER

## 2023-11-21 PROCEDURE — 99214 OFFICE O/P EST MOD 30 MIN: CPT | Performed by: NURSE PRACTITIONER

## 2023-11-21 PROCEDURE — 1159F MED LIST DOCD IN RCRD: CPT | Performed by: NURSE PRACTITIONER

## 2023-11-21 PROCEDURE — 93000 ELECTROCARDIOGRAM COMPLETE: CPT | Performed by: INTERNAL MEDICINE

## 2023-11-21 PROCEDURE — 3078F DIAST BP <80 MM HG: CPT | Performed by: NURSE PRACTITIONER

## 2023-11-21 PROCEDURE — 1036F TOBACCO NON-USER: CPT | Performed by: NURSE PRACTITIONER

## 2023-11-21 ASSESSMENT — ENCOUNTER SYMPTOMS
ORTHOPNEA: 0
RESPIRATORY NEGATIVE: 1
GASTROINTESTINAL NEGATIVE: 1
LIGHT-HEADEDNESS: 0
SYNCOPE: 0
FALLS: 0
DECREASED APPETITE: 0
PALPITATIONS: 0
FOCAL WEAKNESS: 0
DYSPNEA ON EXERTION: 1
SHORTNESS OF BREATH: 0
NERVOUS/ANXIOUS: 1
BLURRED VISION: 0
COUGH: 0
IRREGULAR HEARTBEAT: 0
MEMORY LOSS: 1
DEPRESSION: 0

## 2023-11-21 NOTE — PATIENT INSTRUCTIONS
Continue on current meds  Please keep an updated med list for appointments   Heart healthy, low sodium diet  Mediterranean diet is recommended  Follow up with Dr Zaragoza in March as planned

## 2023-12-05 ENCOUNTER — OFFICE VISIT (OUTPATIENT)
Dept: CARDIOLOGY | Facility: HOSPITAL | Age: 88
End: 2023-12-05
Payer: MEDICARE

## 2023-12-05 VITALS
WEIGHT: 107.1 LBS | SYSTOLIC BLOOD PRESSURE: 127 MMHG | BODY MASS INDEX: 19.59 KG/M2 | DIASTOLIC BLOOD PRESSURE: 74 MMHG | RESPIRATION RATE: 24 BRPM | OXYGEN SATURATION: 100 % | HEART RATE: 58 BPM

## 2023-12-05 DIAGNOSIS — I48.0 PAROXYSMAL ATRIAL FIBRILLATION WITH RAPID VENTRICULAR RESPONSE (MULTI): ICD-10-CM

## 2023-12-05 DIAGNOSIS — I10 PRIMARY HYPERTENSION: ICD-10-CM

## 2023-12-05 DIAGNOSIS — I50.22 CHRONIC SYSTOLIC HEART FAILURE, ACC/AHA STAGE C (MULTI): Primary | ICD-10-CM

## 2023-12-05 PROCEDURE — 1159F MED LIST DOCD IN RCRD: CPT | Performed by: NURSE PRACTITIONER

## 2023-12-05 PROCEDURE — 1036F TOBACCO NON-USER: CPT | Performed by: NURSE PRACTITIONER

## 2023-12-05 PROCEDURE — 1160F RVW MEDS BY RX/DR IN RCRD: CPT | Performed by: NURSE PRACTITIONER

## 2023-12-05 PROCEDURE — 1126F AMNT PAIN NOTED NONE PRSNT: CPT | Performed by: NURSE PRACTITIONER

## 2023-12-05 PROCEDURE — 3074F SYST BP LT 130 MM HG: CPT | Performed by: NURSE PRACTITIONER

## 2023-12-05 PROCEDURE — 99212 OFFICE O/P EST SF 10 MIN: CPT | Performed by: NURSE PRACTITIONER

## 2023-12-05 PROCEDURE — 3078F DIAST BP <80 MM HG: CPT | Performed by: NURSE PRACTITIONER

## 2023-12-05 SDOH — ECONOMIC STABILITY: FOOD INSECURITY: WITHIN THE PAST 12 MONTHS, YOU WORRIED THAT YOUR FOOD WOULD RUN OUT BEFORE YOU GOT MONEY TO BUY MORE.: NEVER TRUE

## 2023-12-05 SDOH — ECONOMIC STABILITY: FOOD INSECURITY: WITHIN THE PAST 12 MONTHS, THE FOOD YOU BOUGHT JUST DIDN'T LAST AND YOU DIDN'T HAVE MONEY TO GET MORE.: NEVER TRUE

## 2023-12-05 ASSESSMENT — ENCOUNTER SYMPTOMS
ABDOMINAL DISTENTION: 0
PALPITATIONS: 0
EYES NEGATIVE: 1
WHEEZING: 0
COUGH: 0
CONFUSION: 0
FEVER: 0
BLOOD IN STOOL: 0
DEPRESSION: 0
CHILLS: 0
HEMATURIA: 0
OCCASIONAL FEELINGS OF UNSTEADINESS: 1
CHEST TIGHTNESS: 0
LIGHT-HEADEDNESS: 0
SHORTNESS OF BREATH: 0
LOSS OF SENSATION IN FEET: 0
WEAKNESS: 0
ACTIVITY CHANGE: 0

## 2023-12-05 ASSESSMENT — PATIENT HEALTH QUESTIONNAIRE - PHQ9
1. LITTLE INTEREST OR PLEASURE IN DOING THINGS: NOT AT ALL
SUM OF ALL RESPONSES TO PHQ9 QUESTIONS 1 AND 2: 0
2. FEELING DOWN, DEPRESSED OR HOPELESS: NOT AT ALL

## 2023-12-05 ASSESSMENT — COLUMBIA-SUICIDE SEVERITY RATING SCALE - C-SSRS
2. HAVE YOU ACTUALLY HAD ANY THOUGHTS OF KILLING YOURSELF?: NO
6. HAVE YOU EVER DONE ANYTHING, STARTED TO DO ANYTHING, OR PREPARED TO DO ANYTHING TO END YOUR LIFE?: NO
1. IN THE PAST MONTH, HAVE YOU WISHED YOU WERE DEAD OR WISHED YOU COULD GO TO SLEEP AND NOT WAKE UP?: NO

## 2023-12-05 NOTE — PROGRESS NOTES
Subjective   Patient ID: Mirtha Acuna is a 89 y.o. female who presents for follow-up of congestive heart failure.     Current Outpatient Medications:     acetaminophen (Tylenol) 500 mg tablet, , Disp: , Rfl:     apixaban (Eliquis) 2.5 mg tablet, Take one tablet every 12 hours, Disp: , Rfl:     atorvastatin (Lipitor) 20 mg tablet, in the morning., Disp: , Rfl:     cholecalciferol (Vitamin D-3) 50 mcg (2,000 unit) capsule, Take by mouth., Disp: , Rfl:     cyanocobalamin (Vitamin B-12) 50 mcg tablet, TAKE 1 TABLET DAILY., Disp: , Rfl:     folic acid (Folvite) 1 mg tablet, TAKE 1 TABLET DAILY., Disp: , Rfl:     furosemide (Lasix) 20 mg tablet, Take 1 tablet (20 mg) by mouth 3 times a week. Take on Monday-Wednesday and Saturday, Disp: , Rfl:     levothyroxine (Synthroid, Levoxyl) 50 mcg tablet, Take 1 tablet (50 mcg) by mouth once daily., Disp: 90 tablet, Rfl: 2    losartan (Cozaar) 25 mg tablet, 1 tablet daily., Disp: , Rfl:     NON FORMULARY, Prevagen 10 MG Oral Capsule; 1 tablet daily., Disp: , Rfl:     thiamine (Vitamin B-1) 50 mg tablet, , Disp: , Rfl:     vit A,C and E-lutein-minerals (Ocuvite with Lutein) 300 mcg-200 mg-27 mg-2 mg tablet, Take 1 tablet by mouth once daily., Disp: , Rfl:      HPI   Past medical history of GI bleed without mesenteric ischemia.  She has history of AV node dysfunction intolerant to beta-blockers.  History of paroxysmal atrial fibrillation.  She is anticoagulated without signs of bleeding.  History of moderate coronary artery disease.  He denies chest pain or palpitations.  He is not particularly more short of breath at all she has no edema.  She is quite thin.  She has assistant full body tremor.  This seems to bother her more than just about anything else.  She does have a DNR order.    Review of Systems   Constitutional:  Negative for activity change, chills and fever.   HENT:  Negative for hearing loss.    Eyes: Negative.    Respiratory:  Negative for cough, chest tightness,  shortness of breath and wheezing.    Cardiovascular:  Negative for chest pain, palpitations and leg swelling.   Gastrointestinal:  Negative for abdominal distention and blood in stool.   Genitourinary:  Negative for hematuria.   Neurological:  Negative for syncope, weakness and light-headedness.        He has persistent tremor which is limiting.   Psychiatric/Behavioral:  Negative for confusion.        Objective     /74 (BP Location: Right arm, Patient Position: Sitting, BP Cuff Size: Adult)   Pulse 58   Resp 24   Wt 48.6 kg (107 lb 1.6 oz)   SpO2 100%   BMI 19.59 kg/m²     [id number]    2021  Echocardiogram  CONCLUSIONS:   1. The left ventricular systolic function is normal with a 60-65% estimated ejection fraction.   2. Spectral Doppler shows an impaired relaxation pattern of left ventricular diastolic filling.    2021 Renal artery duplex  CONCLUSIONS:  Renal Artery Duplex: Bilateral renal arteries demonstrate no evidence of hemodynamically significant stenosis. The bilateral renal veins are widely patent. The exam was performed in the cath lab. The patient was unable to position into the decubitus position. The distal aorta diameter was not obtained.    2021 [facility] with PCI    CONCLUSIONS:   1. Single vessel coronary artery disease without proximal left anterior descending involvement.   2. The 1st diagonal branch showed moderate atherosclerotic disease.   3. Culprit vessel(s): circumflex.   4. Successful PCI of Cx.     Lab Results   Component Value Date    BUN 21 11/10/2023    CREATININE 1.00 11/10/2023    BNP 96 03/23/2022    MG 2.16 10/11/2022    K 4.7 11/10/2023     11/10/2023       Constitutional:       General: NAD frail elderly female  HENT:   Normocephalic.  No other gross abnormality.   No JVD or hepatojugular reflex.  Cardiovascular:      Rate and Rhythm: Normal rate and regular rhythm.  Occasional premature beat noted     Heart sounds: S1, S2 normal, no murmurs, no S3 or S4     Pulmonary:      Effort: Pulmonary effort is normal.      Breath sounds:  Normal respiratory excursion. No wheezes or rales  Abdominal:      General: Abdomen is softly distended. Bowel sounds are normal.      Palpations: Abdomen is soft.   Musculoskeletal:         General:  MCCALL well. No swelling.   Skin:     General: Skin is warm and dry.    Assessment/Plan     Problem List Items Addressed This Visit          Cardiac and Vasculature    Chronic systolic heart failure, ACC/AHA stage C (CMS/HCC) - Primary    Paroxysmal atrial fibrillation with rapid ventricular response (CMS/HCC)    Hypertension        Chronic Diastolic heart failure   Etiology   AHA Stage: C   NYHA class: 2 - 3  Volume Status:  Euvolemic   GFR: 53    GDMT:  BB-  has had low HR in the past with this medications.   ARB/ACEI/ARNI - Losartan 25 mg once a day   MRA -  does not want to take these medicatons.   SGLT2i - does not want to take these medications.   Diuretic - Lasix 20 mg 1 tablet three times a week.   Device Therapy:  not indicated.   Eliquis 2.5 mg twice a day  CHF: well controlled and no significant medication side effects noted.  Stable at this time.   She is frail and intolerant of several medications but seem to be doing reasonably well on current regimen.  No changes at this time.   Labs are stable as noted.       Emphasized salt restriction.  Encouraged daily monitoring of the patient's weight.  Encouraged regular exercise.  Follow up in 3 months.    2. Atrial fibrillation /bradycardia:  she is mostly regular with occas premature beat noted.  She is OAC with eliquis and has no bleeding.     3. ASHD with PCI - stent to Cx in 2021.    Continue STATIN., on Eliquis        4. HTN controlled.     Amna Johns, APRN-CNP

## 2023-12-05 NOTE — PATIENT INSTRUCTIONS
Thank you for coming in today.  If you have any questions you may contact the office Monday through Friday at [phone] or on week ends at [phone].     Continue current medications.     Please follow  a 2 GM sodium diet and limit fluid intake to 2 liters per day or 8 servings ( serving size = 8 oz. = 1 cup = 240 ml) per day.   Please avoid processed meat products (luncheon meats, sausages, sullivan, hot dogs for example) eat 4 servings of vegetables and 1-2 whole servings of whole fruits per day.   Please weigh daily and call [phone] for weight gain of 3 pounds in 24 hours or 5 pounds or if you experience increased swelling or shortness of breath.     Follow up in 3 months.

## 2023-12-06 DIAGNOSIS — E03.9 HYPOTHYROIDISM, UNSPECIFIED TYPE: Primary | ICD-10-CM

## 2023-12-06 RX ORDER — LEVOTHYROXINE SODIUM 25 UG/1
25 TABLET ORAL DAILY
Qty: 90 TABLET | Refills: 1 | Status: SHIPPED | OUTPATIENT
Start: 2023-12-06 | End: 2024-03-06

## 2023-12-14 DIAGNOSIS — I50.9 CHRONIC HEART FAILURE, UNSPECIFIED HEART FAILURE TYPE (MULTI): Primary | ICD-10-CM

## 2023-12-14 RX ORDER — FUROSEMIDE 20 MG/1
20 TABLET ORAL 3 TIMES WEEKLY
Qty: 90 TABLET | Refills: 1 | Status: SHIPPED | OUTPATIENT
Start: 2023-12-15 | End: 2024-03-11 | Stop reason: SDUPTHER

## 2024-03-05 ENCOUNTER — APPOINTMENT (OUTPATIENT)
Dept: CARDIOLOGY | Facility: HOSPITAL | Age: 89
End: 2024-03-05
Payer: MEDICARE

## 2024-03-06 DIAGNOSIS — E78.5 HYPERLIPIDEMIA, UNSPECIFIED: ICD-10-CM

## 2024-03-06 DIAGNOSIS — I48.0 PAROXYSMAL ATRIAL FIBRILLATION (MULTI): ICD-10-CM

## 2024-03-06 DIAGNOSIS — E03.9 HYPOTHYROIDISM, UNSPECIFIED TYPE: ICD-10-CM

## 2024-03-06 RX ORDER — LEVOTHYROXINE SODIUM 25 UG/1
25 TABLET ORAL DAILY
Qty: 90 TABLET | Refills: 3 | Status: SHIPPED | OUTPATIENT
Start: 2024-03-06

## 2024-03-11 ENCOUNTER — OFFICE VISIT (OUTPATIENT)
Dept: CARDIOLOGY | Facility: HOSPITAL | Age: 89
End: 2024-03-11
Payer: MEDICARE

## 2024-03-11 VITALS
RESPIRATION RATE: 24 BRPM | SYSTOLIC BLOOD PRESSURE: 98 MMHG | OXYGEN SATURATION: 97 % | DIASTOLIC BLOOD PRESSURE: 64 MMHG | BODY MASS INDEX: 19.31 KG/M2 | HEART RATE: 73 BPM | WEIGHT: 105.6 LBS

## 2024-03-11 DIAGNOSIS — I50.9 CHRONIC HEART FAILURE, UNSPECIFIED HEART FAILURE TYPE (MULTI): ICD-10-CM

## 2024-03-11 DIAGNOSIS — I50.22 CHRONIC SYSTOLIC HEART FAILURE, ACC/AHA STAGE C (MULTI): Primary | ICD-10-CM

## 2024-03-11 PROCEDURE — 1159F MED LIST DOCD IN RCRD: CPT | Performed by: NURSE PRACTITIONER

## 2024-03-11 PROCEDURE — 99213 OFFICE O/P EST LOW 20 MIN: CPT | Mod: ZK | Performed by: NURSE PRACTITIONER

## 2024-03-11 PROCEDURE — 1160F RVW MEDS BY RX/DR IN RCRD: CPT | Performed by: NURSE PRACTITIONER

## 2024-03-11 PROCEDURE — 1126F AMNT PAIN NOTED NONE PRSNT: CPT | Performed by: NURSE PRACTITIONER

## 2024-03-11 PROCEDURE — 99213 OFFICE O/P EST LOW 20 MIN: CPT | Performed by: NURSE PRACTITIONER

## 2024-03-11 PROCEDURE — 1036F TOBACCO NON-USER: CPT | Performed by: NURSE PRACTITIONER

## 2024-03-11 PROCEDURE — 1157F ADVNC CARE PLAN IN RCRD: CPT | Performed by: NURSE PRACTITIONER

## 2024-03-11 PROCEDURE — 3074F SYST BP LT 130 MM HG: CPT | Performed by: NURSE PRACTITIONER

## 2024-03-11 PROCEDURE — 3078F DIAST BP <80 MM HG: CPT | Performed by: NURSE PRACTITIONER

## 2024-03-11 RX ORDER — FUROSEMIDE 20 MG/1
20 TABLET ORAL 3 TIMES WEEKLY
Qty: 90 TABLET | Refills: 2 | Status: SHIPPED | OUTPATIENT
Start: 2024-03-11

## 2024-03-11 SDOH — ECONOMIC STABILITY: FOOD INSECURITY: WITHIN THE PAST 12 MONTHS, YOU WORRIED THAT YOUR FOOD WOULD RUN OUT BEFORE YOU GOT MONEY TO BUY MORE.: NEVER TRUE

## 2024-03-11 SDOH — ECONOMIC STABILITY: FOOD INSECURITY: WITHIN THE PAST 12 MONTHS, THE FOOD YOU BOUGHT JUST DIDN'T LAST AND YOU DIDN'T HAVE MONEY TO GET MORE.: NEVER TRUE

## 2024-03-11 ASSESSMENT — ENCOUNTER SYMPTOMS
LOSS OF SENSATION IN FEET: 0
CONFUSION: 0
CHEST TIGHTNESS: 0
ABDOMINAL DISTENTION: 0
CHILLS: 0
WEAKNESS: 0
LIGHT-HEADEDNESS: 0
PALPITATIONS: 0
ACTIVITY CHANGE: 0
SHORTNESS OF BREATH: 0
EYES NEGATIVE: 1
WHEEZING: 0
DEPRESSION: 0
FEVER: 0
BLOOD IN STOOL: 0
OCCASIONAL FEELINGS OF UNSTEADINESS: 1
COUGH: 0
HEMATURIA: 0

## 2024-03-11 ASSESSMENT — COLUMBIA-SUICIDE SEVERITY RATING SCALE - C-SSRS
1. IN THE PAST MONTH, HAVE YOU WISHED YOU WERE DEAD OR WISHED YOU COULD GO TO SLEEP AND NOT WAKE UP?: NO
2. HAVE YOU ACTUALLY HAD ANY THOUGHTS OF KILLING YOURSELF?: NO
6. HAVE YOU EVER DONE ANYTHING, STARTED TO DO ANYTHING, OR PREPARED TO DO ANYTHING TO END YOUR LIFE?: NO

## 2024-03-11 ASSESSMENT — PATIENT HEALTH QUESTIONNAIRE - PHQ9
2. FEELING DOWN, DEPRESSED OR HOPELESS: NOT AT ALL
1. LITTLE INTEREST OR PLEASURE IN DOING THINGS: NOT AT ALL
SUM OF ALL RESPONSES TO PHQ9 QUESTIONS 1 AND 2: 0

## 2024-03-11 NOTE — PATIENT INSTRUCTIONS
Thank you for coming in today.  If you have any questions you may contact the office Monday through Friday at [phone] or on week ends at [phone].    Continue current medications without change.     Please get lab work completed.      Please follow  a 2 GM sodium diet and limit fluid intake to 2 liters per day or 8 servings ( serving size = 8 oz. = 1 cup = 240 ml) per day.   Please avoid processed meat products (luncheon meats, sausages, sullivan, hot dogs for example) eat 4 servings of vegetables and 1-2 whole servings of whole fruits per day.   Please weigh daily and call [phone] for weight gain of 3 pounds in 24 hours or 5 pounds or if you experience increased swelling or shortness of breath.         Follow up:  6 months.     Please be sure to follow up with your cardiologist at [facility] once every year. Call [phone] to schedule appointment if you do not have a follow up appointment scheduled already.

## 2024-03-11 NOTE — PROGRESS NOTES
Subjective   Patient ID: Mirtha Acuna is a 89 y.o. female who presents for follow-up of congestive heart failure.     Current Outpatient Medications:     acetaminophen (Tylenol) 500 mg tablet, , Disp: , Rfl:     apixaban (Eliquis) 2.5 mg tablet, Take one tablet every 12 hours, Disp: , Rfl:     atorvastatin (Lipitor) 20 mg tablet, in the morning., Disp: , Rfl:     cholecalciferol (Vitamin D-3) 50 mcg (2,000 unit) capsule, Take by mouth., Disp: , Rfl:     cyanocobalamin (Vitamin B-12) 50 mcg tablet, TAKE 1 TABLET DAILY., Disp: , Rfl:     folic acid (Folvite) 1 mg tablet, TAKE 1 TABLET DAILY., Disp: , Rfl:     furosemide (Lasix) 20 mg tablet, Take 1 tablet (20 mg) by mouth 3 times a week. Take on Monday-Wednesday and Saturday, Disp: 90 tablet, Rfl: 1    levothyroxine (Synthroid, Levoxyl) 25 mcg tablet, TAKE 1 TABLET (25 MCG) BY MOUTH DAILY, Disp: 90 tablet, Rfl: 3    losartan (Cozaar) 25 mg tablet, 1 tablet daily., Disp: , Rfl:     NON FORMULARY, Prevagen 10 MG Oral Capsule; 1 tablet daily., Disp: , Rfl:     thiamine (Vitamin B-1) 50 mg tablet, , Disp: , Rfl:     vit A,C and E-lutein-minerals (Ocuvite with Lutein) 300 mcg-200 mg-27 mg-2 mg tablet, Take 1 tablet by mouth once daily., Disp: , Rfl:      HPI   Past medical history of GI bleed without mesenteric ischemia. She has history of AV node dysfunction intolerant to beta-blockers. History of paroxysmal atrial fibrillation. She is anticoagulated without signs of bleeding. History of moderate coronary artery disease. He denies chest pain or palpitations. He is not particularly more short of breath at all she has no edema. She is quite thin. She has persistent full body tremor. This seems to bother her more than just about anything else. She does have a DNR order.     Review of Systems   Constitutional:  Negative for activity change, chills and fever.        Frail    HENT:  Negative for hearing loss.    Eyes: Negative.    Respiratory:  Negative for cough, chest  tightness, shortness of breath and wheezing.    Cardiovascular:  Negative for chest pain, palpitations and leg swelling.   Gastrointestinal:  Negative for abdominal distention and blood in stool.   Genitourinary:  Negative for hematuria.   Neurological:  Negative for syncope, weakness and light-headedness.   Psychiatric/Behavioral:  Negative for confusion.        Objective     BP 98/64 (BP Location: Left arm, Patient Position: Sitting, BP Cuff Size: Small adult)   Pulse 73   Resp 24   Wt 47.9 kg (105 lb 9.6 oz)   SpO2 97%   BMI 19.31 kg/m²         2021  Echocardiogram  CONCLUSIONS:   1. The left ventricular systolic function is normal with a 60-65% estimated ejection fraction.   2. Spectral Doppler shows an impaired relaxation pattern of left ventricular diastolic filling.     2021 Renal artery duplex  CONCLUSIONS:  Renal Artery Duplex: Bilateral renal arteries demonstrate no evidence of hemodynamically significant stenosis. The bilateral renal veins are widely patent. The exam was performed in the cath lab. The patient was unable to position into the decubitus position. The distal aorta diameter was not obtained.     2021 [facility] with PCI    CONCLUSIONS:   1. Single vessel coronary artery disease without proximal left anterior descending involvement.   2. The 1st diagonal branch showed moderate atherosclerotic disease.   3. Culprit vessel(s): circumflex.   4. Successful PCI of Cx.    Lab Results   Component Value Date    BUN 21 11/10/2023    CREATININE 1.00 11/10/2023    BNP 96 03/23/2022    MG 2.16 10/11/2022    K 4.7 11/10/2023     11/10/2023         Constitutional:       General: He is not in acute distress.  HENT:      Head: Normocephalic and atraumatic.      Mouth: Mucous membranes are moist.      Neck:  No JVD or HJR   Eyes:      Extraocular Movements: .      Conjunctiva/sclera: Conjunctivae normal.    Cardiovascular:      Rate and Rhythm: Normal rate and regular rhythm.      Heart sounds:  S1 S2  normal, no murmur, no S3 or S4   Pulmonary:      Effort: Pulmonary effort is normal. No respiratory distress.      Breath sounds: Normal breath sounds. No stridor. No wheezing or rales.   Abdominal:      General: Bowel sounds are normal. There is no distension.      Tenderness: There is no abdominal tenderness. There is no guarding or rebound.   Musculoskeletal:         General: No swelling, tenderness or deformity. Normal range of motion.      Comments:   Tremor noted.   Skin:     General: Skin is warm and dry.   Neurological:      General: No focal deficit present.      Mental Status: alert and oriented to person, place, and time. Mental status is at baseline.     Psychiatric:         Mood and Affect: Mood normal.     Assessment/Plan     Problem List Items Addressed This Visit       Chronic systolic heart failure, ACC/AHA stage C (CMS/HCC)      Chronic Diastolic heart failure   Etiology   AHA Stage: C   NYHA class: 2 - 3  Volume Status:  Euvolemic   GFR: 53     GDMT:  BB-  has had low HR in the past with this medications.   ARB/ACEI/ARNI - Losartan 25 mg once a day   MRA -  does not want to take these medicatons.   SGLT2i - does not want to take these medications.   Diuretic - Lasix 20 mg 1 tablet three times a week.   Device Therapy:  not indicated.   Eliquis 2.5 mg twice a day  CHF: well controlled and no significant medication side effects noted.  Stable at this time.   She is frail and intolerant of several medications but seem to be doing reasonably well on current regimen. No overt congestion.  Will continue current therapy.  BMP/mag ordered today.   Emphasized salt restriction.  Encouraged daily monitoring of the patient's weight.  Encouraged regular exercise.  Follow up in  6 months.      2. Atrial fibrillation /bradycardia:  RRR on auscultation today.  Stable.       3. ASHD with PCI - stent to Cx in 2021.    Continue STATIN., on Eliquis         4. HTN controlled.             Amna Johns, APRN-CNP

## 2024-03-13 ENCOUNTER — OFFICE VISIT (OUTPATIENT)
Dept: CARDIOLOGY | Facility: CLINIC | Age: 89
End: 2024-03-13
Payer: MEDICARE

## 2024-03-13 VITALS
SYSTOLIC BLOOD PRESSURE: 128 MMHG | DIASTOLIC BLOOD PRESSURE: 80 MMHG | HEIGHT: 60 IN | BODY MASS INDEX: 20.62 KG/M2 | HEART RATE: 65 BPM | WEIGHT: 105 LBS

## 2024-03-13 DIAGNOSIS — Z95.5 PRESENCE OF STENT IN CORONARY ARTERY: ICD-10-CM

## 2024-03-13 DIAGNOSIS — R00.1 BRADYCARDIA: ICD-10-CM

## 2024-03-13 DIAGNOSIS — I50.22 CHRONIC SYSTOLIC HEART FAILURE, ACC/AHA STAGE C (MULTI): Primary | ICD-10-CM

## 2024-03-13 DIAGNOSIS — I48.0 PAROXYSMAL ATRIAL FIBRILLATION WITH RAPID VENTRICULAR RESPONSE (MULTI): ICD-10-CM

## 2024-03-13 DIAGNOSIS — I50.42 CHRONIC COMBINED SYSTOLIC AND DIASTOLIC HEART FAILURE (MULTI): ICD-10-CM

## 2024-03-13 DIAGNOSIS — I10 PRIMARY HYPERTENSION: ICD-10-CM

## 2024-03-13 PROCEDURE — 3079F DIAST BP 80-89 MM HG: CPT | Performed by: INTERNAL MEDICINE

## 2024-03-13 PROCEDURE — 1126F AMNT PAIN NOTED NONE PRSNT: CPT | Performed by: INTERNAL MEDICINE

## 2024-03-13 PROCEDURE — 93000 ELECTROCARDIOGRAM COMPLETE: CPT | Performed by: INTERNAL MEDICINE

## 2024-03-13 PROCEDURE — 3074F SYST BP LT 130 MM HG: CPT | Performed by: INTERNAL MEDICINE

## 2024-03-13 PROCEDURE — 99214 OFFICE O/P EST MOD 30 MIN: CPT | Performed by: INTERNAL MEDICINE

## 2024-03-13 PROCEDURE — 1159F MED LIST DOCD IN RCRD: CPT | Performed by: INTERNAL MEDICINE

## 2024-03-13 PROCEDURE — 1036F TOBACCO NON-USER: CPT | Performed by: INTERNAL MEDICINE

## 2024-03-13 PROCEDURE — 1157F ADVNC CARE PLAN IN RCRD: CPT | Performed by: INTERNAL MEDICINE

## 2024-03-13 PROCEDURE — 1160F RVW MEDS BY RX/DR IN RCRD: CPT | Performed by: INTERNAL MEDICINE

## 2024-03-13 RX ORDER — LOSARTAN POTASSIUM 25 MG/1
25 TABLET ORAL DAILY
Qty: 90 TABLET | Refills: 3 | Status: SHIPPED | OUTPATIENT
Start: 2024-03-13

## 2024-03-13 RX ORDER — APIXABAN 2.5 MG/1
2.5 TABLET, FILM COATED ORAL EVERY 12 HOURS
Qty: 180 TABLET | Refills: 3 | OUTPATIENT
Start: 2024-03-13

## 2024-03-13 RX ORDER — ATORVASTATIN CALCIUM 20 MG/1
20 TABLET, FILM COATED ORAL NIGHTLY
Qty: 90 TABLET | Refills: 3 | OUTPATIENT
Start: 2024-03-13

## 2024-03-13 RX ORDER — ATORVASTATIN CALCIUM 20 MG/1
20 TABLET, FILM COATED ORAL DAILY
Qty: 90 TABLET | Refills: 3 | Status: SHIPPED | OUTPATIENT
Start: 2024-03-13 | End: 2024-04-24 | Stop reason: SINTOL

## 2024-03-13 ASSESSMENT — ENCOUNTER SYMPTOMS
OCCASIONAL FEELINGS OF UNSTEADINESS: 0
DEPRESSION: 0
LOSS OF SENSATION IN FEET: 0

## 2024-03-13 NOTE — PROGRESS NOTES
Chief Complaint:   Follow-up (annual)     History Of Present Illness:    Mirtha Acuna is a 89 y.o. female  who presented with cardiogenic shock, rapid atrial fibrillation, systolic heart failure with ejection fraction of 25%. She spontaneously cardioverted and was started on amiodarone. LVEF has now returned to normal (65%).    She underwent right and left heart (2020) which showed moderate nonobstructive coronary artery disease and normal hemodynamics. She was sent home on medical therapy and comes today for follow-up. She feels generally well, overall improved other than weakness and dizziness.  She did not tolerate Aldactone before. She has had some dizziness before that we thought was vertigo. This is not a major issue at this time.  We stopped her amiodarone in 2023 because of dizziness and abnormal LFTs.  She tells me she was admitted to the hospital in December 2021 with chest pain. Reviewed records. Appears to be atypical chest pain more pleuritic. After PE was ruled out she underwent PCI to the left circumflex. 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 This has since resolved.     Her ECG today shows sinus with PACs.    She is also following up in the CHF clinic. Mild shortness of breath on exertion. No loss of consciousness, no ankle swelling or chest pain. Rare palpitations, transient at night.      Last Recorded Vitals:  Vitals:    03/13/24 0937   BP: 128/80   BP Location: Left arm   Pulse: 65   Weight: 47.6 kg (105 lb)   Height: 1.524 m (5')       Past Medical History:  She has a past medical history of Personal history of other diseases of the circulatory system, Pure hypercholesterolemia, unspecified, Unspecified cataract, and Unspecified cataract.    Past Surgical History:  She has a past surgical history that includes Tonsillectomy (05/05/2015); Hysterectomy (05/05/2015); Appendectomy (05/05/2015); Back surgery (05/05/2015); CT angio  abdomen pelvis w and or wo IV IV contrast (7/24/2020); and CT angio abdomen pelvis w and or wo IV IV contrast (8/3/2020).      Social History:  She reports that she has never smoked. She has never used smokeless tobacco. She reports that she does not currently use alcohol. She reports that she does not use drugs.    Family History:  Family History   Problem Relation Name Age of Onset    Hypertension Mother      Alzheimer's disease Sister      Hypertension Sister      Breast cancer Sister      Hypertension Brother          Allergies:  Penicillins, Sulfamethoxazole, and Erythromycin    Outpatient Medications:  Current Outpatient Medications   Medication Instructions    acetaminophen (Tylenol) 500 mg tablet No dose, route, or frequency recorded.    apixaban (Eliquis) 2.5 mg tablet Take one tablet every 12 hours    atorvastatin (Lipitor) 20 mg tablet Daily    cholecalciferol (Vitamin D-3) 50 mcg (2,000 unit) capsule oral    cyanocobalamin (Vitamin B-12) 50 mcg tablet TAKE 1 TABLET DAILY.    folic acid (Folvite) 1 mg tablet TAKE 1 TABLET DAILY.    furosemide (LASIX) 20 mg, oral, 3 times weekly, Take on Monday-Wednesday and Saturday    levothyroxine (SYNTHROID, LEVOXYL) 25 mcg, oral, Daily    losartan (Cozaar) 25 mg tablet 1 tablet daily.    NON FORMULARY Prevagen 10 MG Oral Capsule; 1 tablet daily.    thiamine (Vitamin B-1) 50 mg tablet No dose, route, or frequency recorded.    vit A,C and E-lutein-minerals (Ocuvite with Lutein) 300 mcg-200 mg-27 mg-2 mg tablet 1 tablet, oral, Daily       Physical Exam:  Physical Exam  Vitals reviewed.   Constitutional:       Appearance: Normal appearance.   Neck:      Vascular: No carotid bruit or JVD.   Cardiovascular:      Rate and Rhythm: Normal rate and regular rhythm.      Pulses: Normal pulses.      Heart sounds: Normal heart sounds, S1 normal and S2 normal.   Pulmonary:      Effort: Pulmonary effort is normal. No respiratory distress.      Breath sounds: No wheezing or rales.  "  Abdominal:      General: Abdomen is flat.      Palpations: Abdomen is soft.   Musculoskeletal:      Right lower leg: No edema.      Left lower leg: No edema.   Skin:     General: Skin is warm.   Neurological:      Mental Status: She is alert and oriented to person, place, and time. Mental status is at baseline.   Psychiatric:         Mood and Affect: Mood normal.         Behavior: Behavior normal.           Last Labs:  CBC -  Lab Results   Component Value Date    WBC 5.1 11/10/2023    HGB 14.4 11/10/2023    HCT 42.3 11/10/2023    MCV 94 11/10/2023     11/10/2023       CMP -  Lab Results   Component Value Date    CALCIUM 9.0 11/10/2023    PHOS 3.3 12/25/2021    PROT 6.8 03/23/2022    ALBUMIN 3.8 03/23/2022    AST 44 (H) 03/15/2023    ALT 39 03/15/2023    ALKPHOS 78 03/23/2022    BILITOT 0.5 03/23/2022       LIPID PANEL -   Lab Results   Component Value Date    CHOL 122 12/25/2021    TRIG 46 12/25/2021    HDL 45.6 12/25/2021    CHHDL 2.7 12/25/2021    LDLF 67 12/25/2021    VLDL 9 12/25/2021       RENAL FUNCTION PANEL -   Lab Results   Component Value Date    GLUCOSE 104 (H) 11/10/2023     11/10/2023    K 4.7 11/10/2023     (H) 11/10/2023    CO2 21 11/10/2023    ANIONGAP 15 11/10/2023    BUN 21 11/10/2023    CREATININE 1.00 11/10/2023    CALCIUM 9.0 11/10/2023    PHOS 3.3 12/25/2021    ALBUMIN 3.8 03/23/2022        Lab Results   Component Value Date    BNP 96 03/23/2022    HGBA1C 5.6 12/25/2021       Last Cardiology Tests:  ECG:  ECG 12 Lead 11/28/2023      Echo:  No results found for this or any previous visit from the past 1095 days.      Ejection Fractions:  No results found for: \"EF\"    Cath:  No results found for this or any previous visit from the past 1095 days.      Stress Test:  No results found for this or any previous visit from the past 1095 days.      Cardiac Imaging:  No results found for this or any previous visit from the past 1095 days.          Assessment/Plan   In summary Ms. " Kalpana is a 89-year-old lady who presented with highly symptomatic paroxysmal rapid atrial fibrillation, cardiogenic shock and systolic heart failure.     1-paroxysmal atrial fibrillation-maintaining sinus rhythm, amiodarone was stopped in 2023.  She is using anticoagulation.  We are not using beta-blockers for multiple reasons see below.        2-chronic diastolic heart failure-has nonischemic cardiomyopathy, currently well compensated. LV function has now returned to normal having some dizziness which could be medication induced, or more likely vertigo. She is not orthostatic.  We have not use beta-blockers as she tends to be bradycardic and dizzy with BP in the lower side.  She will continue to follow-up in the CHF clinic.        3-bradycardia-this has now resolved.  We stopped amiodarone we also are not using beta-blockers because of multiple issues like this as well as dizziness.    No significant bradycardia seen in the 24-hour Holter monitor done in January 2021. Minimum heart rate was 44, no significant pauses. No recurrence of A. fib.     4-coronary artery disease- hx of PCI to left circumflex. She has atypical pleuritic chest pain. I recommend that she discusses this with her PCP as well.     5-long-term toxicities with amiodarone-was discontinued in the past due to dizziness and abnormal liver enzymes.  We discussed having ablation however she declined continue to observe for recurrence of atrial fibrillation.         Continue to follow in CHF clinic and with me in a year.  Continue current medications.         Alannah Zaragoza MD

## 2024-04-24 ENCOUNTER — OFFICE VISIT (OUTPATIENT)
Dept: PRIMARY CARE | Facility: CLINIC | Age: 89
End: 2024-04-24
Payer: MEDICARE

## 2024-04-24 ENCOUNTER — LAB (OUTPATIENT)
Dept: LAB | Facility: LAB | Age: 89
End: 2024-04-24
Payer: MEDICARE

## 2024-04-24 VITALS
DIASTOLIC BLOOD PRESSURE: 72 MMHG | HEIGHT: 60 IN | RESPIRATION RATE: 16 BRPM | OXYGEN SATURATION: 95 % | SYSTOLIC BLOOD PRESSURE: 116 MMHG | WEIGHT: 107 LBS | HEART RATE: 65 BPM | BODY MASS INDEX: 21.01 KG/M2

## 2024-04-24 DIAGNOSIS — E11.51 PERIPHERAL VASCULAR DISEASE IN DIABETES MELLITUS (MULTI): ICD-10-CM

## 2024-04-24 DIAGNOSIS — E78.5 HYPERLIPIDEMIA, UNSPECIFIED HYPERLIPIDEMIA TYPE: Primary | ICD-10-CM

## 2024-04-24 DIAGNOSIS — E53.8 LOW SERUM VITAMIN B12: ICD-10-CM

## 2024-04-24 DIAGNOSIS — E03.9 HYPOTHYROIDISM, UNSPECIFIED TYPE: ICD-10-CM

## 2024-04-24 DIAGNOSIS — D64.9 ANEMIA, UNSPECIFIED TYPE: ICD-10-CM

## 2024-04-24 DIAGNOSIS — Z95.5 PRESENCE OF STENT IN CORONARY ARTERY: ICD-10-CM

## 2024-04-24 DIAGNOSIS — Z00.00 MEDICARE ANNUAL WELLNESS VISIT, SUBSEQUENT: Primary | ICD-10-CM

## 2024-04-24 DIAGNOSIS — I10 PRIMARY HYPERTENSION: ICD-10-CM

## 2024-04-24 DIAGNOSIS — I48.0 PAROXYSMAL ATRIAL FIBRILLATION WITH RAPID VENTRICULAR RESPONSE (MULTI): ICD-10-CM

## 2024-04-24 DIAGNOSIS — E46 PROTEIN-CALORIE MALNUTRITION, UNSPECIFIED SEVERITY (MULTI): ICD-10-CM

## 2024-04-24 DIAGNOSIS — E55.9 VITAMIN D DEFICIENCY: ICD-10-CM

## 2024-04-24 DIAGNOSIS — I72.9 PSEUDOANEURYSM (CMS-HCC): ICD-10-CM

## 2024-04-24 DIAGNOSIS — R00.1 BRADYCARDIA: ICD-10-CM

## 2024-04-24 DIAGNOSIS — K55.9 ISCHEMIC COLITIS (MULTI): ICD-10-CM

## 2024-04-24 DIAGNOSIS — N18.31 STAGE 3A CHRONIC KIDNEY DISEASE (MULTI): ICD-10-CM

## 2024-04-24 DIAGNOSIS — I50.42 CHRONIC COMBINED SYSTOLIC AND DIASTOLIC HEART FAILURE (MULTI): ICD-10-CM

## 2024-04-24 DIAGNOSIS — I50.22 CHRONIC SYSTOLIC HEART FAILURE, ACC/AHA STAGE C (MULTI): ICD-10-CM

## 2024-04-24 LAB
25(OH)D3 SERPL-MCNC: >120 NG/ML (ref 30–100)
ALBUMIN SERPL BCP-MCNC: 3.5 G/DL (ref 3.4–5)
ALP SERPL-CCNC: 96 U/L (ref 33–136)
ALT SERPL W P-5'-P-CCNC: 76 U/L (ref 7–45)
ANION GAP SERPL CALC-SCNC: 8 MMOL/L (ref 10–20)
AST SERPL W P-5'-P-CCNC: 100 U/L (ref 9–39)
BILIRUB DIRECT SERPL-MCNC: 0.1 MG/DL (ref 0–0.3)
BILIRUB SERPL-MCNC: 0.5 MG/DL (ref 0–1.2)
BUN SERPL-MCNC: 20 MG/DL (ref 6–23)
CALCIUM SERPL-MCNC: 9.2 MG/DL (ref 8.6–10.3)
CHLORIDE SERPL-SCNC: 104 MMOL/L (ref 98–107)
CHOLEST SERPL-MCNC: 118 MG/DL (ref 0–199)
CHOLESTEROL/HDL RATIO: 3.3
CO2 SERPL-SCNC: 28 MMOL/L (ref 21–32)
CREAT SERPL-MCNC: 0.95 MG/DL (ref 0.5–1.05)
EGFRCR SERPLBLD CKD-EPI 2021: 57 ML/MIN/1.73M*2
ERYTHROCYTE [DISTWIDTH] IN BLOOD BY AUTOMATED COUNT: 14.5 % (ref 11.5–14.5)
GLUCOSE SERPL-MCNC: 87 MG/DL (ref 74–99)
HCT VFR BLD AUTO: 43.2 % (ref 36–46)
HDLC SERPL-MCNC: 35.6 MG/DL
HGB BLD-MCNC: 14.7 G/DL (ref 12–16)
LDLC SERPL CALC-MCNC: 60 MG/DL
MAGNESIUM SERPL-MCNC: 2.09 MG/DL (ref 1.6–2.4)
MCH RBC QN AUTO: 32.7 PG (ref 26–34)
MCHC RBC AUTO-ENTMCNC: 34 G/DL (ref 32–36)
MCV RBC AUTO: 96 FL (ref 80–100)
NON HDL CHOLESTEROL: 82 MG/DL (ref 0–149)
NRBC BLD-RTO: 0 /100 WBCS (ref 0–0)
PLATELET # BLD AUTO: 225 X10*3/UL (ref 150–450)
POTASSIUM SERPL-SCNC: 4.3 MMOL/L (ref 3.5–5.3)
PROT SERPL-MCNC: 6.5 G/DL (ref 6.4–8.2)
RBC # BLD AUTO: 4.5 X10*6/UL (ref 4–5.2)
SODIUM SERPL-SCNC: 136 MMOL/L (ref 136–145)
TRIGL SERPL-MCNC: 111 MG/DL (ref 0–149)
TSH SERPL-ACNC: 2.8 MIU/L (ref 0.44–3.98)
VIT B12 SERPL-MCNC: 4595 PG/ML (ref 211–911)
VLDL: 22 MG/DL (ref 0–40)
WBC # BLD AUTO: 5.6 X10*3/UL (ref 4.4–11.3)

## 2024-04-24 PROCEDURE — 82607 VITAMIN B-12: CPT

## 2024-04-24 PROCEDURE — 1170F FXNL STATUS ASSESSED: CPT | Performed by: NURSE PRACTITIONER

## 2024-04-24 PROCEDURE — 80053 COMPREHEN METABOLIC PANEL: CPT

## 2024-04-24 PROCEDURE — 1036F TOBACCO NON-USER: CPT | Performed by: NURSE PRACTITIONER

## 2024-04-24 PROCEDURE — 85027 COMPLETE CBC AUTOMATED: CPT

## 2024-04-24 PROCEDURE — 84443 ASSAY THYROID STIM HORMONE: CPT

## 2024-04-24 PROCEDURE — 1157F ADVNC CARE PLAN IN RCRD: CPT | Performed by: NURSE PRACTITIONER

## 2024-04-24 PROCEDURE — 1159F MED LIST DOCD IN RCRD: CPT | Performed by: NURSE PRACTITIONER

## 2024-04-24 PROCEDURE — 99214 OFFICE O/P EST MOD 30 MIN: CPT | Performed by: NURSE PRACTITIONER

## 2024-04-24 PROCEDURE — 83735 ASSAY OF MAGNESIUM: CPT

## 2024-04-24 PROCEDURE — 82248 BILIRUBIN DIRECT: CPT

## 2024-04-24 PROCEDURE — 36415 COLL VENOUS BLD VENIPUNCTURE: CPT

## 2024-04-24 PROCEDURE — 3078F DIAST BP <80 MM HG: CPT | Performed by: NURSE PRACTITIONER

## 2024-04-24 PROCEDURE — 82306 VITAMIN D 25 HYDROXY: CPT

## 2024-04-24 PROCEDURE — G0439 PPPS, SUBSEQ VISIT: HCPCS | Performed by: NURSE PRACTITIONER

## 2024-04-24 PROCEDURE — 80061 LIPID PANEL: CPT

## 2024-04-24 PROCEDURE — 1160F RVW MEDS BY RX/DR IN RCRD: CPT | Performed by: NURSE PRACTITIONER

## 2024-04-24 PROCEDURE — 3074F SYST BP LT 130 MM HG: CPT | Performed by: NURSE PRACTITIONER

## 2024-04-24 RX ORDER — ATORVASTATIN CALCIUM 10 MG/1
10 TABLET, FILM COATED ORAL DAILY
Qty: 100 TABLET | Refills: 3 | Status: SHIPPED | OUTPATIENT
Start: 2024-04-24 | End: 2024-04-25

## 2024-04-24 ASSESSMENT — ACTIVITIES OF DAILY LIVING (ADL)
MANAGING_FINANCES: INDEPENDENT
TAKING_MEDICATION: INDEPENDENT
DOING_HOUSEWORK: INDEPENDENT
BATHING: INDEPENDENT
GROCERY_SHOPPING: INDEPENDENT
DRESSING: INDEPENDENT

## 2024-04-24 ASSESSMENT — ANXIETY QUESTIONNAIRES
GAD7 TOTAL SCORE: 0
5. BEING SO RESTLESS THAT IT IS HARD TO SIT STILL: NOT AT ALL
1. FEELING NERVOUS, ANXIOUS, OR ON EDGE: NOT AT ALL
4. TROUBLE RELAXING: NOT AT ALL
2. NOT BEING ABLE TO STOP OR CONTROL WORRYING: NOT AT ALL
IF YOU CHECKED OFF ANY PROBLEMS ON THIS QUESTIONNAIRE, HOW DIFFICULT HAVE THESE PROBLEMS MADE IT FOR YOU TO DO YOUR WORK, TAKE CARE OF THINGS AT HOME, OR GET ALONG WITH OTHER PEOPLE: NOT DIFFICULT AT ALL
3. WORRYING TOO MUCH ABOUT DIFFERENT THINGS: NOT AT ALL
7. FEELING AFRAID AS IF SOMETHING AWFUL MIGHT HAPPEN: NOT AT ALL
6. BECOMING EASILY ANNOYED OR IRRITABLE: NOT AT ALL

## 2024-04-24 ASSESSMENT — ENCOUNTER SYMPTOMS
OCCASIONAL FEELINGS OF UNSTEADINESS: 0
DEPRESSION: 0
LOSS OF SENSATION IN FEET: 0

## 2024-04-24 NOTE — PROGRESS NOTES
Subjective   Reason for Visit: Mirtha Acuna is an 89 y.o. female here for a Medicare Wellness visit.     Past Medical, Surgical, and Family History reviewed and updated in chart.    Reviewed all medications by prescribing practitioner or clinical pharmacist (such as prescriptions, OTCs, herbal therapies and supplements) and documented in the medical record.    Patient is in a wheelchair accompanied by her daughter (Christiane Madison) and also following up for management of multiple chronic diseases.  Advises she takes her medications as prescribed and her symptoms are controlled with no side effect noted.  Per her daughter present, patient's appetite is good and is sleeps well at night.  Reports she is doing great and denies acute medical complaint.        Patient Care Team:  ANJALI Jauregui-CNP as PCP - General (Family Medicine)     Review of Systems   All other systems reviewed and are negative.      Objective   Vitals:  /72   Pulse 65   Resp 16   Ht 1.524 m (5')   Wt 48.5 kg (107 lb)   SpO2 95%   BMI 20.90 kg/m²       Physical Exam  Vitals reviewed.   Constitutional:       Appearance: Normal appearance.   HENT:      Head: Normocephalic and atraumatic.      Right Ear: Tympanic membrane, ear canal and external ear normal.      Left Ear: Tympanic membrane, ear canal and external ear normal.      Nose: Nose normal.      Mouth/Throat:      Mouth: Mucous membranes are moist.   Eyes:      Extraocular Movements: Extraocular movements intact.      Conjunctiva/sclera: Conjunctivae normal.      Pupils: Pupils are equal, round, and reactive to light.   Cardiovascular:      Rate and Rhythm: Normal rate and regular rhythm.      Pulses: Normal pulses.      Heart sounds: Normal heart sounds.   Pulmonary:      Effort: Pulmonary effort is normal.      Breath sounds: Normal breath sounds.   Abdominal:      General: Abdomen is flat. Bowel sounds are normal.      Palpations: Abdomen is soft.   Musculoskeletal:       Cervical back: Neck supple.   Skin:     General: Skin is warm and dry.   Neurological:      General: No focal deficit present.      Mental Status: She is alert and oriented to person, place, and time.   Psychiatric:         Mood and Affect: Mood normal.         Behavior: Behavior normal.         Thought Content: Thought content normal.         Judgment: Judgment normal.         Assessment/Plan   Problem List Items Addressed This Visit       Medicare annual wellness visit, subsequent     Other Visit Diagnoses       Routine general medical examination at [facility]    -  Primary

## 2024-04-24 NOTE — PATIENT INSTRUCTIONS
Continue taking all current medications as prescribed, complete labs as advised and follow up in 6 months.

## 2024-04-25 ENCOUNTER — TELEPHONE (OUTPATIENT)
Dept: PRIMARY CARE | Facility: CLINIC | Age: 89
End: 2024-04-25
Payer: MEDICARE

## 2024-04-25 DIAGNOSIS — E78.5 HYPERLIPIDEMIA, UNSPECIFIED HYPERLIPIDEMIA TYPE: ICD-10-CM

## 2024-04-25 DIAGNOSIS — E55.9 VITAMIN D DEFICIENCY: ICD-10-CM

## 2024-04-25 DIAGNOSIS — N18.31 STAGE 3A CHRONIC KIDNEY DISEASE (MULTI): ICD-10-CM

## 2024-04-25 DIAGNOSIS — D64.9 ANEMIA, UNSPECIFIED TYPE: Primary | ICD-10-CM

## 2024-04-25 NOTE — TELEPHONE ENCOUNTER
Trenton Middleton, APRN-CNP  [id number] Clinical Support Staff  Please tell patient that her lab results show elevated liver enzymes and very elevated vitamin levels.  I have reduced her atorvastatin to 10 mg daily and she should stop taking all vitamin supplements

## 2024-09-12 ENCOUNTER — APPOINTMENT (OUTPATIENT)
Dept: CARDIOLOGY | Facility: HOSPITAL | Age: 89
End: 2024-09-12
Payer: MEDICARE

## 2024-09-26 ENCOUNTER — APPOINTMENT (OUTPATIENT)
Dept: CARDIOLOGY | Facility: HOSPITAL | Age: 89
End: 2024-09-26
Payer: MEDICARE

## 2024-10-03 ENCOUNTER — APPOINTMENT (OUTPATIENT)
Dept: CARDIOLOGY | Facility: HOSPITAL | Age: 89
End: 2024-10-03
Payer: MEDICARE

## 2024-10-07 ENCOUNTER — OFFICE VISIT (OUTPATIENT)
Dept: CARDIOLOGY | Facility: HOSPITAL | Age: 89
End: 2024-10-07
Payer: MEDICARE

## 2024-10-07 VITALS
RESPIRATION RATE: 20 BRPM | DIASTOLIC BLOOD PRESSURE: 75 MMHG | HEART RATE: 60 BPM | BODY MASS INDEX: 21.13 KG/M2 | WEIGHT: 108.2 LBS | SYSTOLIC BLOOD PRESSURE: 123 MMHG | OXYGEN SATURATION: 97 %

## 2024-10-07 DIAGNOSIS — I50.42 CHRONIC COMBINED SYSTOLIC AND DIASTOLIC HEART FAILURE: ICD-10-CM

## 2024-10-07 DIAGNOSIS — I10 PRIMARY HYPERTENSION: ICD-10-CM

## 2024-10-07 DIAGNOSIS — I50.9 CHRONIC HEART FAILURE, UNSPECIFIED HEART FAILURE TYPE: ICD-10-CM

## 2024-10-07 DIAGNOSIS — I50.22 CHRONIC SYSTOLIC HEART FAILURE, ACC/AHA STAGE C: Primary | ICD-10-CM

## 2024-10-07 DIAGNOSIS — I48.0 PAROXYSMAL ATRIAL FIBRILLATION WITH RAPID VENTRICULAR RESPONSE (MULTI): ICD-10-CM

## 2024-10-07 PROCEDURE — 1036F TOBACCO NON-USER: CPT | Performed by: NURSE PRACTITIONER

## 2024-10-07 PROCEDURE — 99213 OFFICE O/P EST LOW 20 MIN: CPT | Performed by: NURSE PRACTITIONER

## 2024-10-07 PROCEDURE — 1159F MED LIST DOCD IN RCRD: CPT | Performed by: NURSE PRACTITIONER

## 2024-10-07 PROCEDURE — 1126F AMNT PAIN NOTED NONE PRSNT: CPT | Performed by: NURSE PRACTITIONER

## 2024-10-07 PROCEDURE — 1157F ADVNC CARE PLAN IN RCRD: CPT | Performed by: NURSE PRACTITIONER

## 2024-10-07 PROCEDURE — 3074F SYST BP LT 130 MM HG: CPT | Performed by: NURSE PRACTITIONER

## 2024-10-07 PROCEDURE — 3078F DIAST BP <80 MM HG: CPT | Performed by: NURSE PRACTITIONER

## 2024-10-07 RX ORDER — GLUCOSAM/CHONDRO/HERB 149/HYAL 750-100 MG
TABLET ORAL
COMMUNITY

## 2024-10-07 RX ORDER — FUROSEMIDE 20 MG/1
20 TABLET ORAL 3 TIMES WEEKLY
Qty: 90 TABLET | Refills: 1 | Status: SHIPPED | OUTPATIENT
Start: 2024-10-07

## 2024-10-07 RX ORDER — BIOTIN 1 MG
TABLET ORAL
COMMUNITY

## 2024-10-07 RX ORDER — GARLIC 1000 MG
CAPSULE ORAL
COMMUNITY

## 2024-10-07 ASSESSMENT — ENCOUNTER SYMPTOMS
CONFUSION: 0
ARTHRALGIAS: 1
LOSS OF SENSATION IN FEET: 0
LIGHT-HEADEDNESS: 0
PALPITATIONS: 0
SHORTNESS OF BREATH: 1
CHILLS: 0
WHEEZING: 0
EYES NEGATIVE: 1
WEAKNESS: 0
COUGH: 0
BLOOD IN STOOL: 0
OCCASIONAL FEELINGS OF UNSTEADINESS: 0
HEMATURIA: 0
CHEST TIGHTNESS: 0
DEPRESSION: 0
FEVER: 0
ABDOMINAL DISTENTION: 0
ACTIVITY CHANGE: 0

## 2024-10-07 ASSESSMENT — COLUMBIA-SUICIDE SEVERITY RATING SCALE - C-SSRS
2. HAVE YOU ACTUALLY HAD ANY THOUGHTS OF KILLING YOURSELF?: NO
1. IN THE PAST MONTH, HAVE YOU WISHED YOU WERE DEAD OR WISHED YOU COULD GO TO SLEEP AND NOT WAKE UP?: NO
6. HAVE YOU EVER DONE ANYTHING, STARTED TO DO ANYTHING, OR PREPARED TO DO ANYTHING TO END YOUR LIFE?: NO

## 2024-10-07 ASSESSMENT — PAIN SCALES - GENERAL: PAINLEVEL: 0-NO PAIN

## 2024-10-07 NOTE — PROGRESS NOTES
Subjective   Patient ID: Mirtha Acuna is a 89 y.o. female who presents for follow-up of congestive heart failure.     Current Outpatient Medications:     acetaminophen (Tylenol) 500 mg tablet, , Disp: , Rfl:     apixaban (Eliquis) 2.5 mg tablet, Take 1 tablet (2.5 mg) by mouth 2 times a day., Disp: 180 tablet, Rfl: 3    biotin 1 mg tablet, Take by mouth., Disp: , Rfl:     folic acid (Folvite) 1 mg tablet, TAKE 1 TABLET DAILY., Disp: , Rfl:     furosemide (Lasix) 20 mg tablet, Take 1 tablet (20 mg) by mouth 3 times a week. Take on Monday-Wednesday and Saturday, Disp: 90 tablet, Rfl: 2    garlic 1,000 mg capsule, Take by mouth., Disp: , Rfl:     levothyroxine (Synthroid, Levoxyl) 25 mcg tablet, TAKE 1 TABLET (25 MCG) BY MOUTH DAILY, Disp: 90 tablet, Rfl: 3    losartan (Cozaar) 25 mg tablet, Take 1 tablet (25 mg) by mouth once daily., Disp: 90 tablet, Rfl: 3    mv-min/FA/vit K/lutein/zeaxant (PRESERVISION AREDS 2 PLUS MV ORAL), Take by mouth. Ocuvite, Disp: , Rfl:     omega 3-dha-epa-fish oil (Fish OiL) 1,000 (120-180) mg capsule, Fish Oil OIL Refills: 0 DO Active, Disp: , Rfl:     vit C/vit E ac/selenium/ginkgo (MEMORY COMPLEX ORAL), Take by mouth. Prevagen, Disp: , Rfl:      HPI   Past medical history of GI bleed without mesenteric ischemia. She has history of AV node dysfunction intolerant to beta-blockers. History of paroxysmal atrial fibrillation. She is anticoagulated without signs of bleeding. History of moderate coronary artery disease. He denies chest pain or palpitations. He is not particularly more short of breath at all she has no edema. She is quite thin. She has persistent full body tremor. This seems to bother her more than just about anything else. She does have a DNR order.     Daughter is here with her in clinic her weight is stable.  She is feeling reasonably well.  She did have fair amount of shortness of breath  with ambulating into the clinic and then back to the exam room today.  He denies  significant shortness of breath at home no orthopnea or PND no chest pain she has no swelling today.  Daughter does not seem to have any concerns today with how she is doing.       Review of Systems   Constitutional:  Negative for activity change, chills and fever.   HENT:  Negative for hearing loss.    Eyes: Negative.    Respiratory:  Positive for shortness of breath. Negative for cough, chest tightness and wheezing.         She was sob after ambulating into the office and then back to the exam room today.   Denies orthopnea or PND.    Cardiovascular:  Negative for chest pain, palpitations and leg swelling.   Gastrointestinal:  Negative for abdominal distention and blood in stool.   Genitourinary:  Negative for hematuria.   Musculoskeletal:  Positive for arthralgias.        Ambulates with cane.    Neurological:  Negative for syncope, weakness and light-headedness.   Psychiatric/Behavioral:  Negative for confusion.        Objective     /75 (BP Location: Left arm, Patient Position: Sitting)   Pulse 60   Resp 20   Wt 49.1 kg (108 lb 3.2 oz)   SpO2 97%   BMI 21.13 kg/m²     2021  Echocardiogram  CONCLUSIONS:   1. The left ventricular systolic function is normal with a 60-65% estimated ejection fraction.   2. Spectral Doppler shows an impaired relaxation pattern of left ventricular diastolic filling.     2021 Renal artery duplex  CONCLUSIONS:  Renal Artery Duplex: Bilateral renal arteries demonstrate no evidence of hemodynamically significant stenosis. The bilateral renal veins are widely patent. The exam was performed in the cath lab. The patient was unable to position into the decubitus position. The distal aorta diameter was not obtained.     2021 [facility] with PCI    CONCLUSIONS:   1. Single vessel coronary artery disease without proximal left anterior descending involvement.   2. The 1st diagonal branch showed moderate atherosclerotic disease.   3. Culprit vessel(s): circumflex.   4. Successful PCI of  Cx.    Lab Results   Component Value Date    BUN 20 04/24/2024    CREATININE 0.95 04/24/2024    BNP 96 03/23/2022    MG 2.09 04/24/2024    K 4.3 04/24/2024     04/24/2024       Constitutional:       General:  NAD   HENT:      Head: Normocephalic     Mouth: Mucous membranes are moist.      Neck:  No JVD or HJR   Eyes:      Conjunctiva/sclera: Conjunctivae normal.    Cardiovascular:      Rate and Rhythm: Normal rate and regular rhythm      Heart sounds:  S1 S2 normal, no murmur, no S3 or S4   Pulmonary:      Pulmonary effort is normal.  Breath sounds are diminished with few course breath sounds posteriorly that do not clear with cough.   Abdominal:      General: Bowel sounds are normal, non- tender to palpitations. Liver is non palpable at  right costal margin.   Musculoskeletal:         General: No swelling. MCCALL well.   Skin:     General: Skin is warm and dry   Neurological:      General: No focal deficit present.      Mental Status: alert and oriented to person, place, and time. Mental status is at baseline.     Psychiatric:         Mood and Affect: Normal Affect.     Assessment/Plan     Problem List Items Addressed This Visit       Chronic combined systolic and diastolic heart failure - Primary    Hypertension     Other Visit Diagnoses       Chronic systolic heart failure, ACC/AHA stage C               Chronic Diastolic heart failure   Etiology   AHA Stage: C   NYHA class: 2 - 3  Volume Status:  Euvolemic   GFR: 53     GDMT:  BB-   No BB low HR in the past with this medications.   ARB/ACEI/ARNI - Losartan 25 mg once a day   MRA -  does not want to take these medicatons.   SGLT2i - does not want to take these medications.   Diuretic - Lasix 20 mg 1 tablet three times a week.   Device Therapy:  not indicated.   Eliquis 2.5 mg twice a day  CHF: Patient is well compensated today without signs of overt congestion.  She is tolerating her current medications which we will continue without change.  It has been since  April since she has had lab work we will ask that she have a basic metabolic panel BNP and magnesium drawn before the end of the month.  She has follow-up with her primary care shortly.  Continue her current medications.  I am planning to see her in 1 year she has follow-up with Dr. Zaragoza in 6 months. I can certainly see her sooner should the need arise.  Emphasized salt restriction.  Encouraged daily monitoring of the patient's weight.  Encouraged regular exercise.  Follow up in  1 year.   Lab work by the end of the month      2. Atrial fibrillation /bradycardia:  RRR on auscultation today.  Stable.       3. ASHD with PCI - stent to Cx in 2021.    Continue STATIN., on Eliquis         4. HTN controlled.          Amna Johns, APRN-CNP

## 2024-10-07 NOTE — PATIENT INSTRUCTIONS
Thank you for coming in today.  If you have any questions you may contact the office Monday through Friday at [phone] or on week ends at [phone].    Continue current medications.     Get lab work by end of the month.     Please follow  a 2 GM sodium diet and limit fluid intake to 2 liters per day or 8 servings ( serving size = 8 oz. = 1 cup = 240 ml) per day.   Please avoid processed meat products (luncheon meats, sausages, sullivan, hot dogs for example) eat 4 servings of vegetables and 1-2 whole servings of whole fruits per day.   Please weigh daily and call [phone] for weight gain of 3 pounds in 24 hours or 5 pounds or if you experience increased swelling or shortness of breath.         Follow up:  1 year.    Please be sure to follow up with your cardiologist at [facility] once every year. Call [phone] to schedule appointment if you do not have a follow up appointment scheduled already.

## 2024-10-25 ENCOUNTER — TELEPHONE (OUTPATIENT)
Dept: CARDIOLOGY | Facility: HOSPITAL | Age: 89
End: 2024-10-25

## 2024-10-25 ENCOUNTER — APPOINTMENT (OUTPATIENT)
Dept: PRIMARY CARE | Facility: CLINIC | Age: 89
End: 2024-10-25
Payer: MEDICARE

## 2024-10-25 ENCOUNTER — LAB (OUTPATIENT)
Dept: LAB | Facility: LAB | Age: 89
End: 2024-10-25
Payer: MEDICARE

## 2024-10-25 VITALS
HEART RATE: 55 BPM | DIASTOLIC BLOOD PRESSURE: 68 MMHG | OXYGEN SATURATION: 95 % | WEIGHT: 108 LBS | BODY MASS INDEX: 21.2 KG/M2 | HEIGHT: 60 IN | SYSTOLIC BLOOD PRESSURE: 116 MMHG

## 2024-10-25 DIAGNOSIS — I48.0 PAROXYSMAL ATRIAL FIBRILLATION WITH RAPID VENTRICULAR RESPONSE (MULTI): ICD-10-CM

## 2024-10-25 DIAGNOSIS — E03.9 HYPOTHYROIDISM, UNSPECIFIED TYPE: Primary | ICD-10-CM

## 2024-10-25 DIAGNOSIS — Z00.00 MEDICARE ANNUAL WELLNESS VISIT, SUBSEQUENT: ICD-10-CM

## 2024-10-25 DIAGNOSIS — I50.9 CHRONIC HEART FAILURE, UNSPECIFIED HEART FAILURE TYPE: ICD-10-CM

## 2024-10-25 DIAGNOSIS — E78.5 HYPERLIPIDEMIA, UNSPECIFIED HYPERLIPIDEMIA TYPE: ICD-10-CM

## 2024-10-25 DIAGNOSIS — I50.42 CHRONIC COMBINED SYSTOLIC AND DIASTOLIC HEART FAILURE: ICD-10-CM

## 2024-10-25 DIAGNOSIS — I10 PRIMARY HYPERTENSION: ICD-10-CM

## 2024-10-25 DIAGNOSIS — E55.9 VITAMIN D DEFICIENCY: ICD-10-CM

## 2024-10-25 DIAGNOSIS — N18.31 STAGE 3A CHRONIC KIDNEY DISEASE (MULTI): ICD-10-CM

## 2024-10-25 DIAGNOSIS — D64.9 ANEMIA, UNSPECIFIED TYPE: ICD-10-CM

## 2024-10-25 DIAGNOSIS — E53.8 LOW SERUM VITAMIN B12: ICD-10-CM

## 2024-10-25 LAB
25(OH)D3 SERPL-MCNC: 98 NG/ML (ref 30–100)
ALBUMIN SERPL BCP-MCNC: 3.7 G/DL (ref 3.4–5)
ALP SERPL-CCNC: 44 U/L (ref 33–136)
ALT SERPL W P-5'-P-CCNC: 21 U/L (ref 7–45)
ANION GAP SERPL CALC-SCNC: 12 MMOL/L (ref 10–20)
AST SERPL W P-5'-P-CCNC: 27 U/L (ref 9–39)
BILIRUB SERPL-MCNC: 0.4 MG/DL (ref 0–1.2)
BNP SERPL-MCNC: 120 PG/ML (ref 0–99)
BUN SERPL-MCNC: 20 MG/DL (ref 6–23)
CALCIUM SERPL-MCNC: 9.1 MG/DL (ref 8.6–10.3)
CHLORIDE SERPL-SCNC: 106 MMOL/L (ref 98–107)
CHOLEST SERPL-MCNC: 203 MG/DL (ref 0–199)
CHOLESTEROL/HDL RATIO: 5
CO2 SERPL-SCNC: 24 MMOL/L (ref 21–32)
CREAT SERPL-MCNC: 0.91 MG/DL (ref 0.5–1.05)
EGFRCR SERPLBLD CKD-EPI 2021: 59 ML/MIN/1.73M*2
ERYTHROCYTE [DISTWIDTH] IN BLOOD BY AUTOMATED COUNT: 14.3 % (ref 11.5–14.5)
GLUCOSE SERPL-MCNC: 74 MG/DL (ref 74–99)
HCT VFR BLD AUTO: 44 % (ref 36–46)
HDLC SERPL-MCNC: 40.4 MG/DL
HGB BLD-MCNC: 14.3 G/DL (ref 12–16)
LDLC SERPL CALC-MCNC: 129 MG/DL
MAGNESIUM SERPL-MCNC: 2.38 MG/DL (ref 1.6–2.4)
MCH RBC QN AUTO: 31.7 PG (ref 26–34)
MCHC RBC AUTO-ENTMCNC: 32.5 G/DL (ref 32–36)
MCV RBC AUTO: 98 FL (ref 80–100)
NON HDL CHOLESTEROL: 163 MG/DL (ref 0–149)
NRBC BLD-RTO: 0 /100 WBCS (ref 0–0)
PLATELET # BLD AUTO: 218 X10*3/UL (ref 150–450)
POTASSIUM SERPL-SCNC: 4.5 MMOL/L (ref 3.5–5.3)
PROT SERPL-MCNC: 6.5 G/DL (ref 6.4–8.2)
RBC # BLD AUTO: 4.51 X10*6/UL (ref 4–5.2)
SODIUM SERPL-SCNC: 137 MMOL/L (ref 136–145)
TRIGL SERPL-MCNC: 167 MG/DL (ref 0–149)
VIT B12 SERPL-MCNC: 689 PG/ML (ref 211–911)
VLDL: 33 MG/DL (ref 0–40)
WBC # BLD AUTO: 6.3 X10*3/UL (ref 4.4–11.3)

## 2024-10-25 PROCEDURE — 82607 VITAMIN B-12: CPT

## 2024-10-25 PROCEDURE — 36415 COLL VENOUS BLD VENIPUNCTURE: CPT

## 2024-10-25 PROCEDURE — 1160F RVW MEDS BY RX/DR IN RCRD: CPT | Performed by: NURSE PRACTITIONER

## 2024-10-25 PROCEDURE — 1157F ADVNC CARE PLAN IN RCRD: CPT | Performed by: NURSE PRACTITIONER

## 2024-10-25 PROCEDURE — 83735 ASSAY OF MAGNESIUM: CPT

## 2024-10-25 PROCEDURE — 82306 VITAMIN D 25 HYDROXY: CPT

## 2024-10-25 PROCEDURE — 99214 OFFICE O/P EST MOD 30 MIN: CPT | Performed by: NURSE PRACTITIONER

## 2024-10-25 PROCEDURE — 80061 LIPID PANEL: CPT

## 2024-10-25 PROCEDURE — 3078F DIAST BP <80 MM HG: CPT | Performed by: NURSE PRACTITIONER

## 2024-10-25 PROCEDURE — 85027 COMPLETE CBC AUTOMATED: CPT

## 2024-10-25 PROCEDURE — 80053 COMPREHEN METABOLIC PANEL: CPT

## 2024-10-25 PROCEDURE — 1159F MED LIST DOCD IN RCRD: CPT | Performed by: NURSE PRACTITIONER

## 2024-10-25 PROCEDURE — 3074F SYST BP LT 130 MM HG: CPT | Performed by: NURSE PRACTITIONER

## 2024-10-25 PROCEDURE — 83880 ASSAY OF NATRIURETIC PEPTIDE: CPT

## 2024-10-25 RX ORDER — FUROSEMIDE 20 MG/1
20 TABLET ORAL 3 TIMES WEEKLY
Qty: 90 TABLET | Refills: 1 | Status: SHIPPED | OUTPATIENT
Start: 2024-10-25

## 2024-10-25 ASSESSMENT — COLUMBIA-SUICIDE SEVERITY RATING SCALE - C-SSRS
1. IN THE PAST MONTH, HAVE YOU WISHED YOU WERE DEAD OR WISHED YOU COULD GO TO SLEEP AND NOT WAKE UP?: NO
6. HAVE YOU EVER DONE ANYTHING, STARTED TO DO ANYTHING, OR PREPARED TO DO ANYTHING TO END YOUR LIFE?: NO
2. HAVE YOU ACTUALLY HAD ANY THOUGHTS OF KILLING YOURSELF?: NO

## 2024-10-25 ASSESSMENT — PATIENT HEALTH QUESTIONNAIRE - PHQ9
2. FEELING DOWN, DEPRESSED OR HOPELESS: NOT AT ALL
SUM OF ALL RESPONSES TO PHQ9 QUESTIONS 1 AND 2: 0
SUM OF ALL RESPONSES TO PHQ9 QUESTIONS 1 AND 2: 0
2. FEELING DOWN, DEPRESSED OR HOPELESS: NOT AT ALL
1. LITTLE INTEREST OR PLEASURE IN DOING THINGS: NOT AT ALL
1. LITTLE INTEREST OR PLEASURE IN DOING THINGS: NOT AT ALL

## 2024-10-25 ASSESSMENT — ENCOUNTER SYMPTOMS
OCCASIONAL FEELINGS OF UNSTEADINESS: 0
DEPRESSION: 0
LOSS OF SENSATION IN FEET: 0

## 2024-10-25 NOTE — PROGRESS NOTES
Subjective   Patient ID: Mirtha Acuna is a 89 y.o. female who presents for Follow-up.    Patient is in a wheelchair accompanied by her daughter following up for management of multiple chronic diseases.  She is also here for lab results review.  However, she is yet to complete labs that were ordered to be reviewed during his office visit.  Per her daughter, patient ambulates at home without any assistive device.  Appetite is good and she sleeps well at night.  She denies acute medical complaint today.         Review of Systems   Respiratory:  Positive for shortness of breath.    Musculoskeletal:  Positive for arthralgias.   All other systems reviewed and are negative.      Objective   /68   Pulse 55   Ht 1.524 m (5')   Wt 49 kg (108 lb)   SpO2 95%   BMI 21.09 kg/m²     Physical Exam  Vitals reviewed.   Constitutional:       Appearance: Normal appearance.   HENT:      Head: Normocephalic and atraumatic.      Right Ear: External ear normal.      Left Ear: External ear normal.      Nose: Nose normal.      Mouth/Throat:      Mouth: Mucous membranes are moist.   Cardiovascular:      Rate and Rhythm: Regular rhythm. Bradycardia present.      Pulses: Normal pulses.      Heart sounds: Normal heart sounds.   Pulmonary:      Effort: Pulmonary effort is normal.      Breath sounds: Normal breath sounds.   Abdominal:      General: Abdomen is flat. Bowel sounds are normal.      Palpations: Abdomen is soft.   Musculoskeletal:      Cervical back: Neck supple.   Skin:     General: Skin is warm and dry.   Neurological:      General: No focal deficit present.      Mental Status: She is alert and oriented to person, place, and time.   Psychiatric:         Mood and Affect: Mood normal.         Behavior: Behavior normal.         Assessment/Plan   Problem List Items Addressed This Visit       Hypertension    Vitamin D deficiency     Other Visit Diagnoses       Low serum vitamin B12        Serum vitamin B12 level is elevated.   Discontinue all vitamin B supplements.

## 2024-10-25 NOTE — TELEPHONE ENCOUNTER
[facility] Labs were reviewed by Amna Johns and Dr. Tristin Middleton. Lab results were reported to Mirtha's Daughter Christiane. Christiane has no further questions or concerns.  B12 was stopped months ago and the recent results are WDL.

## 2024-10-25 NOTE — PATIENT INSTRUCTIONS
Complete labs ordered by me on 4/25/2024 as advised.  Continue taking all current medications as prescribed and follow-up in 6 months for annual Medicare wellness exam.

## 2024-10-28 ENCOUNTER — TELEPHONE (OUTPATIENT)
Dept: PRIMARY CARE | Facility: CLINIC | Age: 89
End: 2024-10-28
Payer: MEDICARE

## 2024-10-28 PROBLEM — E53.8 LOW SERUM VITAMIN B12: Status: ACTIVE | Noted: 2024-10-28

## 2024-10-28 ASSESSMENT — ENCOUNTER SYMPTOMS
ARTHRALGIAS: 1
SHORTNESS OF BREATH: 1

## 2025-02-21 PROBLEM — S79.919A HIP INJURY: Status: ACTIVE | Noted: 2023-02-08

## 2025-02-21 PROBLEM — S00.81XA: Status: ACTIVE | Noted: 2025-02-21

## 2025-02-21 PROBLEM — E11.9 DIABETES MELLITUS (MULTI): Status: ACTIVE | Noted: 2025-02-21

## 2025-02-21 PROBLEM — Z86.79 HISTORY OF HYPERTENSION: Status: ACTIVE | Noted: 2025-02-21

## 2025-02-21 PROBLEM — E78.00 HYPERCHOLESTEROLEMIA: Status: ACTIVE | Noted: 2023-02-08

## 2025-02-21 PROBLEM — S59.909A ELBOW INJURY: Status: ACTIVE | Noted: 2023-02-08

## 2025-02-21 PROBLEM — M79.603 PAIN OF UPPER EXTREMITY: Status: ACTIVE | Noted: 2023-02-08

## 2025-02-21 PROBLEM — H01.009 BLEPHARITIS: Status: ACTIVE | Noted: 2025-02-21

## 2025-02-21 PROBLEM — S59.919A FOREARM INJURY: Status: ACTIVE | Noted: 2023-02-08

## 2025-02-21 PROBLEM — S09.90XA CLOSED HEAD INJURY: Status: ACTIVE | Noted: 2025-02-21

## 2025-02-21 PROBLEM — H26.9 CATARACT: Status: ACTIVE | Noted: 2025-02-21

## 2025-02-21 PROBLEM — K63.9 DISORDER OF LARGE INTESTINE: Status: ACTIVE | Noted: 2025-02-21

## 2025-03-19 ENCOUNTER — APPOINTMENT (OUTPATIENT)
Dept: CARDIOLOGY | Facility: CLINIC | Age: OVER 89
End: 2025-03-19
Payer: MEDICARE

## 2025-04-25 ENCOUNTER — APPOINTMENT (OUTPATIENT)
Dept: PRIMARY CARE | Facility: CLINIC | Age: OVER 89
End: 2025-04-25
Payer: MEDICARE

## 2025-04-25 DIAGNOSIS — Z00.00 ROUTINE GENERAL MEDICAL EXAMINATION AT HEALTH CARE FACILITY: ICD-10-CM

## 2025-04-25 DIAGNOSIS — R25.1 TREMOR: ICD-10-CM

## 2025-04-25 DIAGNOSIS — E78.00 HYPERCHOLESTEROLEMIA: ICD-10-CM

## 2025-04-25 DIAGNOSIS — I10 PRIMARY HYPERTENSION: ICD-10-CM

## 2025-04-25 DIAGNOSIS — E11.59 TYPE 2 DIABETES MELLITUS WITH OTHER CIRCULATORY COMPLICATION, WITHOUT LONG-TERM CURRENT USE OF INSULIN: ICD-10-CM

## 2025-04-25 DIAGNOSIS — Z00.00 MEDICARE ANNUAL WELLNESS VISIT, SUBSEQUENT: Primary | ICD-10-CM

## 2025-04-25 DIAGNOSIS — N18.31 STAGE 3A CHRONIC KIDNEY DISEASE (MULTI): ICD-10-CM

## 2025-04-25 DIAGNOSIS — E55.9 VITAMIN D DEFICIENCY: ICD-10-CM

## 2025-04-25 DIAGNOSIS — E03.9 HYPOTHYROIDISM, UNSPECIFIED TYPE: ICD-10-CM

## 2025-04-25 DIAGNOSIS — R00.1 BRADYCARDIA: ICD-10-CM

## 2025-04-25 DIAGNOSIS — I48.0 PAROXYSMAL ATRIAL FIBRILLATION WITH RAPID VENTRICULAR RESPONSE (MULTI): ICD-10-CM

## 2025-04-25 DIAGNOSIS — I50.22 CHRONIC SYSTOLIC HEART FAILURE: ICD-10-CM

## 2025-04-25 PROCEDURE — 3079F DIAST BP 80-89 MM HG: CPT | Performed by: NURSE PRACTITIONER

## 2025-04-25 PROCEDURE — 99214 OFFICE O/P EST MOD 30 MIN: CPT | Performed by: NURSE PRACTITIONER

## 2025-04-25 PROCEDURE — 1170F FXNL STATUS ASSESSED: CPT | Performed by: NURSE PRACTITIONER

## 2025-04-25 PROCEDURE — 1159F MED LIST DOCD IN RCRD: CPT | Performed by: NURSE PRACTITIONER

## 2025-04-25 PROCEDURE — 1157F ADVNC CARE PLAN IN RCRD: CPT | Performed by: NURSE PRACTITIONER

## 2025-04-25 PROCEDURE — 3075F SYST BP GE 130 - 139MM HG: CPT | Performed by: NURSE PRACTITIONER

## 2025-04-25 PROCEDURE — G0439 PPPS, SUBSEQ VISIT: HCPCS | Performed by: NURSE PRACTITIONER

## 2025-04-25 PROCEDURE — 1160F RVW MEDS BY RX/DR IN RCRD: CPT | Performed by: NURSE PRACTITIONER

## 2025-04-25 ASSESSMENT — ANXIETY QUESTIONNAIRES
6. BECOMING EASILY ANNOYED OR IRRITABLE: NOT AT ALL
2. NOT BEING ABLE TO STOP OR CONTROL WORRYING: NOT AT ALL
3. WORRYING TOO MUCH ABOUT DIFFERENT THINGS: NOT AT ALL
GAD7 TOTAL SCORE: 0
4. TROUBLE RELAXING: NOT AT ALL
7. FEELING AFRAID AS IF SOMETHING AWFUL MIGHT HAPPEN: NOT AT ALL
1. FEELING NERVOUS, ANXIOUS, OR ON EDGE: NOT AT ALL
5. BEING SO RESTLESS THAT IT IS HARD TO SIT STILL: NOT AT ALL

## 2025-04-25 ASSESSMENT — ACTIVITIES OF DAILY LIVING (ADL)
GROCERY_SHOPPING: INDEPENDENT
MANAGING FINANCES: INDEPENDENT
ADEQUATE_TO_COMPLETE_ADL: YES
DRESSING: INDEPENDENT
USING TRANSPORTATION: INDEPENDENT
MANAGING_FINANCES: INDEPENDENT
TAKING MEDICATION: INDEPENDENT
TOILETING: INDEPENDENT
EATING: INDEPENDENT
PILL BOX USED: YES
BATHING: INDEPENDENT
TAKING_MEDICATION: INDEPENDENT
DRESSING: INDEPENDENT
NEEDS ASSISTANCE WITH FOOD: INDEPENDENT
BATHING: INDEPENDENT
GROCERY SHOPPING: INDEPENDENT
DOING HOUSEWORK: INDEPENDENT
STIL DRIVING: NO
PREPARING MEALS: INDEPENDENT
FEEDING: INDEPENDENT
JUDGMENT_ADEQUATE_SAFELY_COMPLETE_DAILY_ACTIVITIES: YES
DOING_HOUSEWORK: INDEPENDENT
USING TELEPHONE: INDEPENDENT

## 2025-04-25 ASSESSMENT — PATIENT HEALTH QUESTIONNAIRE - PHQ9
2. FEELING DOWN, DEPRESSED OR HOPELESS: NOT AT ALL
SUM OF ALL RESPONSES TO PHQ9 QUESTIONS 1 AND 2: 0
1. LITTLE INTEREST OR PLEASURE IN DOING THINGS: NOT AT ALL

## 2025-04-25 ASSESSMENT — ENCOUNTER SYMPTOMS
DEPRESSION: 0
LOSS OF SENSATION IN FEET: 0
OCCASIONAL FEELINGS OF UNSTEADINESS: 0

## 2025-04-25 NOTE — ASSESSMENT & PLAN NOTE
Orders:    Follow Up In [facility] - PCP - Established; Future    Comprehensive Metabolic Panel; Future    Hemoglobin A1C; Future    Albumin-Creatinine Ratio, Urine Random; Future

## 2025-04-25 NOTE — PATIENT INSTRUCTIONS
Continue taking all current medications as prescribed and complete labs a few days prior to 6-month follow-up.

## 2025-04-25 NOTE — ASSESSMENT & PLAN NOTE
Orders:    Follow Up In [facility] - PCP - Established; Future    TSH with reflex to Free T4 if abnormal; Future

## 2025-04-25 NOTE — PROGRESS NOTES
Subjective   Reason for Visit: Mirtha Acuna is an 89 y.o. female here for a Medicare Wellness visit.     Past Medical, Surgical, and Family History reviewed and updated in chart.    Reviewed all medications by prescribing practitioner or clinical pharmacist (such as prescriptions, OTCs, herbal therapies and supplements) and documented in the medical record.    Patient is ambulatory with a cane for assistance accompanied by daughter following up for annual Medicare management and management of type 2 diabetes mellitus, hypothyroidism, vitamin D deficiency, chronic kidney disease stage IIIa, tremors, hypertension and hypercholesterolemia.  She is under the care of cardiology for management of chronic systolic congestive heart failure, paroxysmal A-fib with rapid ventricular response and bradycardia.  She is compliant with her prescribed medications.  She denies acute medical complaint.        Patient Care Team:  ROSA Jauregui DNP as PCP - General (Family Medicine)  ROSA Jauregui DNP as PCP - [employer] PCP     Review of Systems   Neurological:         Stable tremors.   All other systems reviewed and are negative.      Objective   Vitals:  Ht 1.524 m (5')   Wt 49.9 kg (110 lb)   BMI 21.48 kg/m²       Physical Exam  Vitals reviewed.   Constitutional:       Appearance: Normal appearance.   HENT:      Head: Normocephalic and atraumatic.      Right Ear: External ear normal.      Left Ear: External ear normal.      Nose: Nose normal.      Mouth/Throat:      Mouth: Mucous membranes are moist.   Eyes:      Extraocular Movements: Extraocular movements intact.      Conjunctiva/sclera: Conjunctivae normal.      Pupils: Pupils are equal, round, and reactive to light.   Cardiovascular:      Rate and Rhythm: Regular rhythm. Bradycardia present.      Pulses: Normal pulses.      Heart sounds: Normal heart sounds.   Pulmonary:      Effort: Pulmonary effort is normal.      Breath sounds:  Normal breath sounds.   Abdominal:      General: Abdomen is flat. Bowel sounds are normal.      Palpations: Abdomen is soft.   Musculoskeletal:      Cervical back: Neck supple.   Skin:     General: Skin is warm and dry.   Neurological:      General: No focal deficit present.      Mental Status: She is alert and oriented to person, place, and time.   Psychiatric:         Mood and Affect: Mood normal.         Behavior: Behavior normal.         Thought Content: Thought content normal.         Judgment: Judgment normal.         Assessment & Plan  Medicare annual wellness visit, subsequent    Orders:    Follow Up In [facility] - PCP - Medicare Annual    Routine general medical examination at [facility]    Orders:    1 Year Follow Up In [facility] Wellness Exam; Future    Hypothyroidism, unspecified type    Orders:    Follow Up In [facility] PCP [facility]; Future    TSH with reflex to Free T4 if abnormal; Future    Hypercholesterolemia    Orders:    Lipid panel; Future    Vitamin D deficiency    Orders:    Vitamin D 25-Hydroxy,Total (for eval of Vitamin D levels); Future    Primary hypertension    Orders:    Follow Up In [facility] Primary Care - PCP [facility]; Future    CBC; Future    Type 2 diabetes mellitus with other circulatory complication, without long-term current use of insulin    Orders:    Follow Up In [facility] Primary Care - PCP [facility]; Future    Comprehensive Metabolic Panel; Future    Hemoglobin A1C; Future    Albumin-Creatinine Ratio, Urine Random; Future    Bradycardia         Paroxysmal atrial fibrillation with rapid ventricular response (Multi)         Chronic systolic heart failure         Stage 3a chronic kidney disease (Multi)         Tremor

## 2025-04-27 VITALS
DIASTOLIC BLOOD PRESSURE: 84 MMHG | HEIGHT: 60 IN | HEART RATE: 57 BPM | BODY MASS INDEX: 21.6 KG/M2 | WEIGHT: 110 LBS | OXYGEN SATURATION: 97 % | SYSTOLIC BLOOD PRESSURE: 138 MMHG

## 2025-04-27 PROBLEM — Z86.79 HISTORY OF HYPERTENSION: Status: RESOLVED | Noted: 2025-02-21 | Resolved: 2025-04-27

## 2025-04-27 PROBLEM — K52.9 COLITIS: Status: RESOLVED | Noted: 2023-02-08 | Resolved: 2025-04-27

## 2025-05-05 DIAGNOSIS — I50.22 CHRONIC SYSTOLIC HEART FAILURE, ACC/AHA STAGE C: ICD-10-CM

## 2025-05-05 DIAGNOSIS — I50.9 CHRONIC HEART FAILURE, UNSPECIFIED HEART FAILURE TYPE: ICD-10-CM

## 2025-05-06 RX ORDER — LOSARTAN POTASSIUM 25 MG/1
25 TABLET ORAL DAILY
Qty: 90 TABLET | Refills: 3 | Status: SHIPPED | OUTPATIENT
Start: 2025-05-06 | End: 2026-05-06

## 2025-05-06 RX ORDER — FUROSEMIDE 20 MG/1
20 TABLET ORAL
Qty: 48 TABLET | Refills: 3 | Status: SHIPPED | OUTPATIENT
Start: 2025-05-06 | End: 2026-05-06

## 2025-05-18 DIAGNOSIS — E03.9 HYPOTHYROIDISM, UNSPECIFIED TYPE: ICD-10-CM

## 2025-05-19 RX ORDER — LEVOTHYROXINE SODIUM 25 UG/1
25 TABLET ORAL DAILY
Qty: 90 TABLET | Refills: 3 | Status: SHIPPED | OUTPATIENT
Start: 2025-05-19

## 2025-06-21 LAB
25(OH)D3+25(OH)D2 SERPL-MCNC: 91 NG/ML (ref 30–100)
ALBUMIN SERPL-MCNC: 3.9 G/DL (ref 3.6–5.1)
ALP SERPL-CCNC: 39 U/L (ref 37–153)
ALT SERPL-CCNC: 16 U/L (ref 6–29)
ANION GAP SERPL CALCULATED.4IONS-SCNC: 9 MMOL/L (CALC) (ref 7–17)
AST SERPL-CCNC: 22 U/L (ref 10–35)
BILIRUB SERPL-MCNC: 0.5 MG/DL (ref 0.2–1.2)
BUN SERPL-MCNC: 25 MG/DL (ref 7–25)
CALCIUM SERPL-MCNC: 9.7 MG/DL (ref 8.6–10.4)
CHLORIDE SERPL-SCNC: 105 MMOL/L (ref 98–110)
CHOLEST SERPL-MCNC: 213 MG/DL
CHOLEST/HDLC SERPL: 5.1 (CALC)
CO2 SERPL-SCNC: 26 MMOL/L (ref 20–32)
CREAT SERPL-MCNC: 1.14 MG/DL (ref 0.6–0.95)
EGFRCR SERPLBLD CKD-EPI 2021: 45 ML/MIN/1.73M2
ERYTHROCYTE [DISTWIDTH] IN BLOOD BY AUTOMATED COUNT: 13.7 % (ref 11–15)
EST. AVERAGE GLUCOSE BLD GHB EST-MCNC: 114 MG/DL
EST. AVERAGE GLUCOSE BLD GHB EST-SCNC: 6.3 MMOL/L
GLUCOSE SERPL-MCNC: 67 MG/DL (ref 65–139)
HBA1C MFR BLD: 5.6 %
HCT VFR BLD AUTO: 46.4 % (ref 35–45)
HDLC SERPL-MCNC: 42 MG/DL
HGB BLD-MCNC: 15.1 G/DL (ref 11.7–15.5)
LDLC SERPL CALC-MCNC: 140 MG/DL (CALC)
MCH RBC QN AUTO: 32 PG (ref 27–33)
MCHC RBC AUTO-ENTMCNC: 32.5 G/DL (ref 32–36)
MCV RBC AUTO: 98.3 FL (ref 80–100)
NONHDLC SERPL-MCNC: 171 MG/DL (CALC)
PLATELET # BLD AUTO: 218 THOUSAND/UL (ref 140–400)
PMV BLD REES-ECKER: 10.4 FL (ref 7.5–12.5)
POTASSIUM SERPL-SCNC: 4.1 MMOL/L (ref 3.5–5.3)
PROT SERPL-MCNC: 6.9 G/DL (ref 6.1–8.1)
RBC # BLD AUTO: 4.72 MILLION/UL (ref 3.8–5.1)
SODIUM SERPL-SCNC: 140 MMOL/L (ref 135–146)
TRIGL SERPL-MCNC: 172 MG/DL
TSH SERPL-ACNC: 2.92 MIU/L (ref 0.4–4.5)
WBC # BLD AUTO: 6.5 THOUSAND/UL (ref 3.8–10.8)

## 2025-06-23 ENCOUNTER — TELEPHONE (OUTPATIENT)
Dept: PRIMARY CARE | Facility: CLINIC | Age: OVER 89
End: 2025-06-23
Payer: MEDICARE

## 2025-06-23 NOTE — TELEPHONE ENCOUNTER
----- Message from Trenton Middleton sent at 6/22/2025  8:59 PM EDT -----  Lab results are good besides elevated cholesterol levels.  No new orders.  ----- Message -----  From: Ignacio De La Cruz Results In  Sent: 6/21/2025   6:26 AM EDT  To: Trenton Middleton, APRN-CNP, DNP

## 2025-06-25 ENCOUNTER — APPOINTMENT (OUTPATIENT)
Dept: CARDIOLOGY | Facility: HOSPITAL | Age: OVER 89
End: 2025-06-25
Payer: MEDICARE

## 2025-06-25 VITALS
DIASTOLIC BLOOD PRESSURE: 78 MMHG | HEIGHT: 60 IN | BODY MASS INDEX: 20.62 KG/M2 | WEIGHT: 105 LBS | HEART RATE: 58 BPM | SYSTOLIC BLOOD PRESSURE: 110 MMHG

## 2025-06-25 DIAGNOSIS — I50.9 CHRONIC HEART FAILURE, UNSPECIFIED HEART FAILURE TYPE: ICD-10-CM

## 2025-06-25 DIAGNOSIS — I50.22 CHRONIC SYSTOLIC HEART FAILURE: Primary | ICD-10-CM

## 2025-06-25 DIAGNOSIS — Z95.5 PRESENCE OF STENT IN CORONARY ARTERY: ICD-10-CM

## 2025-06-25 DIAGNOSIS — I48.0 PAROXYSMAL ATRIAL FIBRILLATION WITH RAPID VENTRICULAR RESPONSE (MULTI): ICD-10-CM

## 2025-06-25 DIAGNOSIS — I10 PRIMARY HYPERTENSION: ICD-10-CM

## 2025-06-25 DIAGNOSIS — I50.22 CHRONIC SYSTOLIC HEART FAILURE, ACC/AHA STAGE C: ICD-10-CM

## 2025-06-25 DIAGNOSIS — R00.1 BRADYCARDIA: ICD-10-CM

## 2025-06-25 PROCEDURE — 99213 OFFICE O/P EST LOW 20 MIN: CPT | Performed by: INTERNAL MEDICINE

## 2025-06-25 PROCEDURE — 1036F TOBACCO NON-USER: CPT | Performed by: INTERNAL MEDICINE

## 2025-06-25 PROCEDURE — 93005 ELECTROCARDIOGRAM TRACING: CPT | Performed by: INTERNAL MEDICINE

## 2025-06-25 PROCEDURE — 99214 OFFICE O/P EST MOD 30 MIN: CPT | Performed by: INTERNAL MEDICINE

## 2025-06-25 PROCEDURE — 3074F SYST BP LT 130 MM HG: CPT | Performed by: INTERNAL MEDICINE

## 2025-06-25 PROCEDURE — 3078F DIAST BP <80 MM HG: CPT | Performed by: INTERNAL MEDICINE

## 2025-06-25 PROCEDURE — 1159F MED LIST DOCD IN RCRD: CPT | Performed by: INTERNAL MEDICINE

## 2025-06-25 PROCEDURE — 1160F RVW MEDS BY RX/DR IN RCRD: CPT | Performed by: INTERNAL MEDICINE

## 2025-06-25 RX ORDER — ATORVASTATIN CALCIUM 40 MG/1
40 TABLET, FILM COATED ORAL DAILY
Qty: 90 TABLET | Refills: 3 | Status: SHIPPED | OUTPATIENT
Start: 2025-06-25 | End: 2026-06-25

## 2025-06-25 RX ORDER — FUROSEMIDE 20 MG/1
20 TABLET ORAL 3 TIMES WEEKLY
Qty: 36 TABLET | Refills: 3 | Status: SHIPPED | OUTPATIENT
Start: 2025-06-25 | End: 2026-06-25

## 2025-06-25 ASSESSMENT — ENCOUNTER SYMPTOMS
DEPRESSION: 0
LOSS OF SENSATION IN FEET: 0
OCCASIONAL FEELINGS OF UNSTEADINESS: 0

## 2025-06-25 NOTE — PROGRESS NOTES
Chief Complaint:   Follow-up (Annual)     History Of Present Illness:    Mirtah Acuna is a 89 y.o. female presenting for annual follow-up.    She had initially presented with cardiogenic shock, rapid atrial fibrillation, systolic heart failure with ejection fraction of 25%. She spontaneously cardioverted and was started on amiodarone. LVEF has now returned to normal (65%).    She underwent right and left heart (2020) which showed moderate nonobstructive coronary artery disease and normal hemodynamics.  She feels generally well, overall improved other than weakness and dizziness.  She did not tolerate Aldactone before. She has had some dizziness before that we thought was vertigo. This is not a major issue at this time.  We stopped her amiodarone in 2023 because of dizziness and abnormal LFTs.  She tells me she was admitted to the hospital in December 2021 with chest pain. Reviewed records. Appears to be atypical chest pain more pleuritic. After PE was ruled out she underwent PCI to the left circumflex. 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 This has since resolved.     Her ECG today shows sinus with PVCs.     She is also following up in the CHF clinic. Mild shortness of breath on exertion. No loss of consciousness, no ankle swelling or chest pain. Rare palpitations, transient at night.    .     Last Recorded Vitals:  Vitals:    06/25/25 1140   BP: 110/78   BP Location: Left arm   Pulse: 58   Weight: 47.6 kg (105 lb)   Height: (!) 1.524 m (5')       Past Medical History:  She has a past medical history of Chronic systolic heart failure, ACC/AHA stage C (02/08/2023), Personal history of other diseases of the circulatory system, Pure hypercholesterolemia, unspecified, Unspecified cataract, and Unspecified cataract.    Past Surgical History:  She has a past surgical history that includes Tonsillectomy (05/05/2015); Hysterectomy (05/05/2015); Appendectomy  (05/05/2015); Back surgery (05/05/2015); CT angio abdomen pelvis w and or wo IV IV contrast (7/24/2020); and CT angio abdomen pelvis w and or wo IV IV contrast (8/3/2020).      Social History:  She reports that she has never smoked. She has never used smokeless tobacco. She reports that she does not currently use alcohol. She reports that she does not use drugs.    Family History:  Family History[1]     Allergies:  Penicillins, Sulfamethoxazole, and Erythromycin    Outpatient Medications:  Current Outpatient Medications   Medication Instructions    acetaminophen (Tylenol) 500 mg tablet No dose, route, or frequency recorded.    apixaban (ELIQUIS) 2.5 mg, oral, 2 times daily    biotin 1 mg tablet Take by mouth.    folic acid (Folvite) 1 mg tablet TAKE 1 TABLET DAILY.    furosemide (LASIX) 20 mg, oral, 4 times weekly, Take on Monday-Wednesday - Friday  and Saturday ( 4 days per week)    garlic 1,000 mg capsule Take by mouth.    levothyroxine (SYNTHROID, LEVOXYL) 25 mcg, oral, Daily    losartan (COZAAR) 25 mg, oral, Daily    mv-min/FA/vit K/lutein/zeaxant (PRESERVISION AREDS 2 PLUS MV ORAL) Take by mouth. Ocuvite    omega 3-dha-epa-fish oil (Fish OiL) 1,000 (120-180) mg capsule Fish Oil OIL Refills: 0 DO Active    vit C/vit E ac/selenium/ginkgo (MEMORY COMPLEX ORAL) Take by mouth. Prevagen       Physical Exam:  Physical Exam  Vitals reviewed.   Constitutional:       Appearance: Normal appearance.   Neck:      Vascular: No carotid bruit or JVD.   Cardiovascular:      Rate and Rhythm: Normal rate and regular rhythm.      Pulses: Normal pulses.      Heart sounds: Normal heart sounds, S1 normal and S2 normal.   Pulmonary:      Effort: Pulmonary effort is normal. No respiratory distress.      Breath sounds: No wheezing or rales.   Abdominal:      General: Abdomen is flat.      Palpations: Abdomen is soft.   Musculoskeletal:      Right lower leg: No edema.      Left lower leg: No edema.   Skin:     General: Skin is warm.  "  Neurological:      Mental Status: She is alert and oriented to person, place, and time. Mental status is at baseline.   Psychiatric:         Mood and Affect: Mood normal.         Behavior: Behavior normal.           Last Labs:  CBC -  Lab Results   Component Value Date    WBC 6.5 06/20/2025    HGB 15.1 06/20/2025    HCT 46.4 (H) 06/20/2025    MCV 98.3 06/20/2025     06/20/2025       CMP -  Lab Results   Component Value Date    CALCIUM 9.7 06/20/2025    PHOS 3.3 12/25/2021    PROT 6.9 06/20/2025    ALBUMIN 3.9 06/20/2025    AST 22 06/20/2025    ALT 16 06/20/2025    ALKPHOS 39 06/20/2025    BILITOT 0.5 06/20/2025       LIPID PANEL -   Lab Results   Component Value Date    CHOL 213 (H) 06/20/2025    TRIG 172 (H) 06/20/2025    HDL 42 (L) 06/20/2025    CHHDL 5.1 (H) 06/20/2025    LDLF 67 12/25/2021    VLDL 33 10/25/2024    NHDL 171 (H) 06/20/2025       RENAL FUNCTION PANEL -   Lab Results   Component Value Date    GLUCOSE 67 06/20/2025     06/20/2025    K 4.1 06/20/2025     06/20/2025    CO2 26 06/20/2025    ANIONGAP 9 06/20/2025    BUN 25 06/20/2025    CREATININE 1.14 (H) 06/20/2025    CALCIUM 9.7 06/20/2025    PHOS 3.3 12/25/2021    ALBUMIN 3.9 06/20/2025        Lab Results   Component Value Date     (H) 10/25/2024    HGBA1C 5.6 06/20/2025       Last Cardiology Tests:  ECG:  ECG 12 Lead 03/13/2024      Echo:  No results found for this or any previous visit from the past 1095 days.      Ejection Fractions:  No results found for: \"EF\"    Cath:  No results found for this or any previous visit from the past 1095 days.      Stress Test:  No results found for this or any previous visit from the past 1095 days.      Cardiac Imaging:  No results found for this or any previous visit from the past 1095 days.          Assessment/Plan     In summary Ms. Acuna is a 89-year-old lady who presented with highly symptomatic paroxysmal rapid atrial fibrillation, cardiogenic shock and systolic heart failure.   "   1-paroxysmal atrial fibrillation-maintaining sinus rhythm, amiodarone was stopped in 2023.  She is using anticoagulation.  We are not using beta-blockers for multiple reasons see below.          2-chronic diastolic heart failure-has nonischemic cardiomyopathy, currently well compensated. LV function has now returned to normal having some dizziness which could be medication induced, or more likely vertigo. She is not orthostatic.  We have not use beta-blockers as she tends to be bradycardic and dizzy with BP in the lower side.  She will continue to follow-up in the CHF clinic.        3-bradycardia-this has now resolved.  We stopped amiodarone we also are not using beta-blockers because of multiple issues noted above as well as dizziness.     No significant bradycardia seen in the 24-hour Holter monitor done in January 2021. Minimum heart rate was 44, no significant pauses. No recurrence of A. fib.     4-coronary artery disease- hx of PCI to left circumflex. She has atypical pleuritic chest pain. I recommend that she discusses this with her PCP as well.  Her statins were discontinued today I restarted.  LDL is 140.     5-long-term toxicities with amiodarone-was discontinued in the past due to dizziness and abnormal liver enzymes.  We discussed having ablation however she declined continue to observe for recurrence of atrial fibrillation.       Alannah Zaragoza MD         [1]   Family History  Problem Relation Name Age of Onset    Hypertension Mother      Alzheimer's disease Sister      Hypertension Sister      Breast cancer Sister      Hypertension Brother

## 2025-06-27 LAB
ATRIAL RATE: 58 BPM
P AXIS: 67 DEGREES
P OFFSET: 175 MS
P ONSET: 115 MS
PR INTERVAL: 224 MS
Q ONSET: 227 MS
QRS COUNT: 10 BEATS
QRS DURATION: 72 MS
QT INTERVAL: 436 MS
QTC CALCULATION(BAZETT): 428 MS
QTC FREDERICIA: 430 MS
R AXIS: -28 DEGREES
T AXIS: 72 DEGREES
T OFFSET: 445 MS
VENTRICULAR RATE: 58 BPM

## 2025-07-15 DIAGNOSIS — I50.9 CHRONIC HEART FAILURE, UNSPECIFIED HEART FAILURE TYPE: ICD-10-CM

## 2025-07-15 RX ORDER — FUROSEMIDE 20 MG/1
TABLET ORAL
Qty: 36 TABLET | Refills: 3 | Status: SHIPPED | OUTPATIENT
Start: 2025-07-15

## 2025-07-15 RX ORDER — FUROSEMIDE 20 MG/1
TABLET ORAL
Qty: 36 TABLET | Refills: 3 | Status: SHIPPED | OUTPATIENT
Start: 2025-07-15 | End: 2025-07-15 | Stop reason: SDUPTHER

## 2025-07-15 NOTE — TELEPHONE ENCOUNTER
Clarified with daughter, Christiane, she has been taking her lasix 20 mg three days per week for some time and has been stable on that.  Sending clarification to pharmacy

## 2025-10-27 ENCOUNTER — APPOINTMENT (OUTPATIENT)
Dept: PRIMARY CARE | Facility: CLINIC | Age: OVER 89
End: 2025-10-27
Payer: MEDICARE

## 2026-04-24 ENCOUNTER — APPOINTMENT (OUTPATIENT)
Dept: PRIMARY CARE | Facility: CLINIC | Age: OVER 89
End: 2026-04-24
Payer: MEDICARE